# Patient Record
Sex: FEMALE | Race: WHITE | Employment: FULL TIME | URBAN - METROPOLITAN AREA
[De-identification: names, ages, dates, MRNs, and addresses within clinical notes are randomized per-mention and may not be internally consistent; named-entity substitution may affect disease eponyms.]

---

## 2017-05-22 ENCOUNTER — OFFICE VISIT (OUTPATIENT)
Dept: INTERNAL MEDICINE CLINIC | Facility: CLINIC | Age: 42
End: 2017-05-22

## 2017-05-22 VITALS
DIASTOLIC BLOOD PRESSURE: 80 MMHG | SYSTOLIC BLOOD PRESSURE: 116 MMHG | BODY MASS INDEX: 29 KG/M2 | RESPIRATION RATE: 16 BRPM | HEART RATE: 71 BPM | WEIGHT: 167 LBS | TEMPERATURE: 98 F

## 2017-05-22 DIAGNOSIS — B37.2 CANDIDAL SKIN INFECTION: ICD-10-CM

## 2017-05-22 DIAGNOSIS — R21 RASH AND NONSPECIFIC SKIN ERUPTION: Primary | ICD-10-CM

## 2017-05-22 PROCEDURE — 99212 OFFICE O/P EST SF 10 MIN: CPT | Performed by: INTERNAL MEDICINE

## 2017-05-22 PROCEDURE — 99214 OFFICE O/P EST MOD 30 MIN: CPT | Performed by: INTERNAL MEDICINE

## 2017-05-22 RX ORDER — SULFAMETHOXAZOLE AND TRIMETHOPRIM 800; 160 MG/1; MG/1
1 TABLET ORAL 2 TIMES DAILY
Qty: 6 TABLET | Refills: 0 | Status: SHIPPED | OUTPATIENT
Start: 2017-05-22 | End: 2017-05-25

## 2017-05-22 RX ORDER — CLOTRIMAZOLE 1 %
1 CREAM (GRAM) TOPICAL 2 TIMES DAILY
Qty: 14 G | Refills: 0 | Status: SHIPPED | OUTPATIENT
Start: 2017-05-22 | End: 2018-02-13

## 2017-05-22 NOTE — PROGRESS NOTES
Hazel Hale is a 37year old female.   Patient presents with:  Rash: presenting for a rash by lip and under neck x1 week      HPI:   Patient is a 28-year-old woman no significant past medical history complains of a rash she has noticed just on the an History   Procedure Laterality Date   •   2007      Social History:    Smoking Status: Never Smoker                      Smokeless Status: Never Used                        Alcohol Use: Yes                Comment: Socially       REVIEW OF SY inf-= will treat for mrsa as well , will check labs for vit def   The patient indicates understanding of these issues and agrees to the plan.   Advised to wash hands frequently  Can try some triple antibiotic ointment only at the lateral edge where there is

## 2017-06-27 ENCOUNTER — OFFICE VISIT (OUTPATIENT)
Dept: OBGYN CLINIC | Facility: CLINIC | Age: 42
End: 2017-06-27

## 2017-06-27 VITALS
WEIGHT: 162 LBS | HEIGHT: 63 IN | DIASTOLIC BLOOD PRESSURE: 68 MMHG | BODY MASS INDEX: 28.7 KG/M2 | SYSTOLIC BLOOD PRESSURE: 103 MMHG | HEART RATE: 81 BPM

## 2017-06-27 DIAGNOSIS — N85.2 ENLARGED UTERUS: ICD-10-CM

## 2017-06-27 DIAGNOSIS — Z01.411 ENCOUNTER FOR GYNECOLOGICAL EXAMINATION WITH ABNORMAL FINDING: Primary | ICD-10-CM

## 2017-06-27 DIAGNOSIS — Z97.5 IUD (INTRAUTERINE DEVICE) IN PLACE: ICD-10-CM

## 2017-06-27 DIAGNOSIS — N89.8 VAGINAL ODOR: ICD-10-CM

## 2017-06-27 PROCEDURE — 99396 PREV VISIT EST AGE 40-64: CPT | Performed by: OBSTETRICS & GYNECOLOGY

## 2017-06-27 PROCEDURE — 99212 OFFICE O/P EST SF 10 MIN: CPT | Performed by: OBSTETRICS & GYNECOLOGY

## 2017-06-27 NOTE — PROGRESS NOTES
Tenzin Cardona is a 40year old female  No LMP recorded. Patient is not currently having periods (Reason: IUD - Intrauterine Device).   Patient presents with:  Gyn Exam: ANNUAL EXAM  Pt c/o intermittent fishy vaginal odor- is not present today- r denies blurred or double vision  Cardiovascular:  denies chest pain or palpitations  Respiratory:  denies shortness of breath  Gastrointestinal:  denies heartburn, abdominal pain, diarrhea or constipation  Genitourinary:  denies dysuria, incontinence, abno (intrauterine device) in place  ASCCP guidelines discussed,cotest done,rtc 1 year for annual exam  Will call with US results- discussed prob fibroids with pt and expectant management since asymptomatic. Call if vag odor recurs and does not resolve.       Vivien Burkett

## 2017-06-27 NOTE — ADDENDUM NOTE
Addended by: [facility] [facility] [place] 6/27/2017 03:59 PM     Modules accepted: Orders

## 2017-07-17 ENCOUNTER — TELEPHONE (OUTPATIENT)
Dept: OBGYN CLINIC | Facility: CLINIC | Age: 42
End: 2017-07-17

## 2017-07-17 NOTE — TELEPHONE ENCOUNTER
it is just a matter of what she prefers, if she does not mind having her periods and does not want hormone in it then the paragard should be chosen.   But if she has had no problems with the mirena then there is no reason she should not just have it replac

## 2017-07-17 NOTE — TELEPHONE ENCOUNTER
Pt never recvd her results saw Dr Niki Pratt 6/17 for pap/annual exm/[place] loc. lv vm if no answer.  [phone]

## 2017-07-17 NOTE — TELEPHONE ENCOUNTER
Pt informed of below and verbalized understanding. States she will think about it and call to schedule the exchange.

## 2017-07-18 ENCOUNTER — HOSPITAL ENCOUNTER (OUTPATIENT)
Dept: ULTRASOUND IMAGING | Age: 42
Discharge: HOME | End: 2017-07-18
Attending: OBSTETRICS & GYNECOLOGY
Payer: COMMERCIAL

## 2017-07-18 DIAGNOSIS — N85.2 ENLARGED UTERUS: ICD-10-CM

## 2017-07-18 PROCEDURE — 76830 TRANSVAGINAL US NON-OB: CPT | Performed by: OBSTETRICS & GYNECOLOGY

## 2017-07-18 PROCEDURE — 76856 US EXAM PELVIC COMPLETE: CPT | Performed by: OBSTETRICS & GYNECOLOGY

## 2017-08-01 ENCOUNTER — TELEPHONE (OUTPATIENT)
Dept: OBGYN CLINIC | Facility: CLINIC | Age: 42
End: 2017-08-01

## 2017-08-01 NOTE — TELEPHONE ENCOUNTER
----- Message from Shelia Schilling MD sent at 7/27/2017 11:19 AM CDT -----  Uterus is slightly enlarged but there are no fbroids seen, US is otherwise normal

## 2017-08-08 NOTE — PROGRESS NOTES
Neg pap, HPV neg, repeat pap needed in 3 years, return to clinic 1 year for annual exam. Letter sent via mail.

## 2017-12-05 ENCOUNTER — IMMUNIZATION (OUTPATIENT)
Dept: INTERNAL MEDICINE CLINIC | Facility: CLINIC | Age: 42
End: 2017-12-05

## 2017-12-05 DIAGNOSIS — Z23 NEED FOR VACCINATION: ICD-10-CM

## 2017-12-05 PROCEDURE — 90686 IIV4 VACC NO PRSV 0.5 ML IM: CPT | Performed by: INTERNAL MEDICINE

## 2017-12-05 PROCEDURE — 90471 IMMUNIZATION ADMIN: CPT | Performed by: INTERNAL MEDICINE

## 2018-01-23 ENCOUNTER — OFFICE VISIT (OUTPATIENT)
Dept: INTERNAL MEDICINE CLINIC | Facility: CLINIC | Age: 43
End: 2018-01-23

## 2018-01-23 VITALS
SYSTOLIC BLOOD PRESSURE: 102 MMHG | HEIGHT: 63 IN | HEART RATE: 69 BPM | WEIGHT: 161 LBS | TEMPERATURE: 98 F | BODY MASS INDEX: 28.53 KG/M2 | DIASTOLIC BLOOD PRESSURE: 68 MMHG

## 2018-01-23 DIAGNOSIS — Z00.00 ROUTINE GENERAL MEDICAL EXAMINATION AT A HEALTH CARE FACILITY: Primary | ICD-10-CM

## 2018-01-23 DIAGNOSIS — L98.9 SKIN LESION: ICD-10-CM

## 2018-01-23 DIAGNOSIS — N60.12 FIBROCYSTIC BREAST CHANGES OF BOTH BREASTS: ICD-10-CM

## 2018-01-23 DIAGNOSIS — R92.2 DENSE BREAST: ICD-10-CM

## 2018-01-23 DIAGNOSIS — Z91.89 AT HIGH RISK FOR BREAST CANCER: ICD-10-CM

## 2018-01-23 DIAGNOSIS — R21 RASH AND NONSPECIFIC SKIN ERUPTION: ICD-10-CM

## 2018-01-23 DIAGNOSIS — N60.11 FIBROCYSTIC BREAST CHANGES OF BOTH BREASTS: ICD-10-CM

## 2018-01-23 PROCEDURE — 99396 PREV VISIT EST AGE 40-64: CPT | Performed by: INTERNAL MEDICINE

## 2018-01-23 NOTE — PROGRESS NOTES
HPI:    Patient ID: Molina Garsia is a 40year old female.     HPI    Physical exam    Blood work from work  Per pt WNL  Recent  Need to replace IUD  Will see  Page        /68 (BP Location: Right arm, Patient Position: Sitting, Cuff Size: adult Surgical History:  , :    Family History   Problem Relation Age of Onset   • Cancer Mother      Cancer, thyroid   • Breast Cancer Mother 39      Social History: Smoking status: Never Smoker (CPT=77066/52367), SURGERY - INTERNAL     Self breast exam advised    (R92.2) Dense breast  Plan: [facility] JACOBY 2D+3D DIAGNOSTIC [facility]  BILAT         (CPT=77066/67990), SURGERY - INTERNAL  . Breast exam.  Bilateral fibrocystic breast    No discrete palpable masses

## 2018-01-26 ENCOUNTER — HOSPITAL ENCOUNTER (OUTPATIENT)
Dept: MAMMOGRAPHY | Facility: HOSPITAL | Age: 43
Discharge: HOME | End: 2018-01-26
Attending: INTERNAL MEDICINE
Payer: COMMERCIAL

## 2018-01-26 DIAGNOSIS — N60.11 FIBROCYSTIC BREAST CHANGES OF BOTH BREASTS: ICD-10-CM

## 2018-01-26 DIAGNOSIS — N60.12 FIBROCYSTIC BREAST CHANGES OF BOTH BREASTS: ICD-10-CM

## 2018-01-26 DIAGNOSIS — R92.2 DENSE BREAST: ICD-10-CM

## 2018-01-26 DIAGNOSIS — Z91.89 AT HIGH RISK FOR BREAST CANCER: ICD-10-CM

## 2018-01-26 PROCEDURE — 77062 BREAST TOMOSYNTHESIS BI: CPT | Performed by: INTERNAL MEDICINE

## 2018-01-26 PROCEDURE — 77066 DX MAMMO INCL CAD BI: CPT | Performed by: INTERNAL MEDICINE

## 2018-02-13 ENCOUNTER — OFFICE VISIT (OUTPATIENT)
Dept: DERMATOLOGY CLINIC | Facility: CLINIC | Age: 43
End: 2018-02-13

## 2018-02-13 DIAGNOSIS — D23.72 DERMATOFIBROMA OF LEFT LOWER LEG: Primary | ICD-10-CM

## 2018-02-13 DIAGNOSIS — L30.4 INTERTRIGO: ICD-10-CM

## 2018-02-13 DIAGNOSIS — D48.5 NEOPLASM OF UNCERTAIN BEHAVIOR OF SKIN: ICD-10-CM

## 2018-02-13 PROCEDURE — 11100 BIOPSY OF SKIN LESION: CPT | Performed by: DERMATOLOGY

## 2018-02-13 PROCEDURE — 88305 TISSUE EXAM BY PATHOLOGIST: CPT | Performed by: DERMATOLOGY

## 2018-02-13 PROCEDURE — 99202 OFFICE O/P NEW SF 15 MIN: CPT | Performed by: DERMATOLOGY

## 2018-02-13 RX ORDER — DESONIDE 0.5 MG/G
CREAM TOPICAL
Qty: 30 G | Refills: 2 | Status: SHIPPED | OUTPATIENT
Start: 2018-02-13

## 2018-02-13 NOTE — PROGRESS NOTES
Past Medical History:   Diagnosis Date   • Pregnancy 2004    ; Outcome/detail:  40 week 6 lb(s) 9 oz Male   • Pregnancy 2007    ;  Outcome/detail:  39 week 7 lb(s) 15 oz Male     Past Surgical History:  , :     Social Histor

## 2018-02-13 NOTE — PROGRESS NOTES
HPI:     Chief Complaint     Lesion; Rash        HPI     Lesion    Additional comments: New pt. Pt presents with lesion on Left shin, present x years, pt states it has been getting redder and bleeds when shaved over.  Pt has family hx of skin cancer on pat Past Surgical History:  , :     Social History  Social History   Marital status:   Spouse name: N/A    Years of education: N/A  Number of children: N/A     Occupational History  None on file     Social History Main Topics   Smok out atypia–macro shave      Orders Placed This Encounter      BIOPSY OF SKIN LESION      Specimen to Pathology, Tissue [IHP Pt to Marylin Cage    Results From Past 48 Hours:  No results found for this or any previous visit (from the past 48 hour(s)).     Med

## 2018-03-16 ENCOUNTER — TELEPHONE (OUTPATIENT)
Dept: OBGYN CLINIC | Facility: CLINIC | Age: 43
End: 2018-03-16

## 2018-03-16 DIAGNOSIS — Z32.00 PREGNANCY EXAMINATION OR TEST, PREGNANCY UNCONFIRMED: Primary | ICD-10-CM

## 2018-03-16 NOTE — TELEPHONE ENCOUNTER
Pt requesting IUD removal. States Dr. Torres Jackson did the insert, requesting removal w/ any female provider. pls adv.

## 2018-04-13 NOTE — TELEPHONE ENCOUNTER
Dr. Mary Carmen Workman pt, had IUD placed 2012 ( 2017). Pt saw [facility] in 2017 for annual. She has appt scheduled for IUD removal on  with [facility]. Pt asking if she can do an IUD exchange that day. IUD is . Routed to [facility] for recs.  If serum hcg the day bef

## 2018-04-14 NOTE — TELEPHONE ENCOUNTER
Informed pt that [facility] stated ok for IUD exchange. Pt wants the Mirena. Pt will have a hcg serum bhcg done at the day before her exchange. Informed pt where to go for the bhcg. Pt has the appt time for the appt, location and time with [facility].

## 2018-04-14 NOTE — TELEPHONE ENCOUNTER
Yes ok for IUD exchange but she needs to decide which IUD she wants- paragard? Another mirena?   Yes she needs to have a serum bhcg done the day before her exchange

## 2018-04-16 ENCOUNTER — APPOINTMENT (OUTPATIENT)
Dept: LAB | Age: 43
End: 2018-04-16
Attending: INTERNAL MEDICINE
Payer: COMMERCIAL

## 2018-04-16 DIAGNOSIS — Z32.00 PREGNANCY EXAMINATION OR TEST, PREGNANCY UNCONFIRMED: ICD-10-CM

## 2018-04-16 PROCEDURE — 36415 COLL VENOUS BLD VENIPUNCTURE: CPT

## 2018-04-16 PROCEDURE — 84703 CHORIONIC GONADOTROPIN ASSAY: CPT

## 2018-04-17 ENCOUNTER — OFFICE VISIT (OUTPATIENT)
Dept: OBGYN CLINIC | Facility: CLINIC | Age: 43
End: 2018-04-17

## 2018-04-17 VITALS
WEIGHT: 156 LBS | SYSTOLIC BLOOD PRESSURE: 108 MMHG | HEART RATE: 80 BPM | BODY MASS INDEX: 28 KG/M2 | DIASTOLIC BLOOD PRESSURE: 74 MMHG

## 2018-04-17 DIAGNOSIS — Z30.433 ENCOUNTER FOR REMOVAL AND REINSERTION OF INTRAUTERINE CONTRACEPTIVE DEVICE: Primary | ICD-10-CM

## 2018-04-17 PROCEDURE — 58301 REMOVE INTRAUTERINE DEVICE: CPT | Performed by: OBSTETRICS & GYNECOLOGY

## 2018-04-17 PROCEDURE — 58300 INSERT INTRAUTERINE DEVICE: CPT | Performed by: OBSTETRICS & GYNECOLOGY

## 2018-05-24 NOTE — TELEPHONE ENCOUNTER
Notes Recorded by Michael Concepcion MD on 7/5/2017 at 4:49 PM CDT  Neg pap, HPV neg, repeat pap needed in 3 years, return to clinic 1 year for annual exam,send letter    Pt informed of [facility]s recs and verbalized understanding.  Pt also asking when she is due t [FreeTextEntry1] : Pt c/o Right side head pain by temple do to tripping over an extension cord  in her basement on 5/18/18. \par Pt states she had no pain for the first 3 days but a lot of bleeding after it happened. After three days she has had on/off pain. \par Pt denies any nausea after fall. \par Pt denies N/f or W/c.\par Rx refills on  Enalapril, Metoprolol, Omeprazole, Simvastatin. \par Pt also requesting new scrips for Mammo, Colonoscopy and Endoscopy.  \par  [de-identified] : the pt tripped over extension cord 6 days ago and sustained laceration to right side of forehead and c/o twinge of pain on right side of head several times over the past 3 days. States it is momentary and mild not requiring any meds. No LOC,No  Nausea or vomiting

## 2018-06-12 ENCOUNTER — OFFICE VISIT (OUTPATIENT)
Dept: INTERNAL MEDICINE CLINIC | Facility: CLINIC | Age: 43
End: 2018-06-12

## 2018-06-12 VITALS
DIASTOLIC BLOOD PRESSURE: 66 MMHG | BODY MASS INDEX: 28.53 KG/M2 | TEMPERATURE: 98 F | HEIGHT: 63 IN | HEART RATE: 69 BPM | SYSTOLIC BLOOD PRESSURE: 99 MMHG | WEIGHT: 161 LBS

## 2018-06-12 DIAGNOSIS — M53.3 SACRAL PAIN: Primary | ICD-10-CM

## 2018-06-12 PROCEDURE — 99212 OFFICE O/P EST SF 10 MIN: CPT | Performed by: INTERNAL MEDICINE

## 2018-06-12 PROCEDURE — 99213 OFFICE O/P EST LOW 20 MIN: CPT | Performed by: INTERNAL MEDICINE

## 2018-06-12 NOTE — PROGRESS NOTES
Urszula Page is a 44year old female. Patient presents with:  Pain: Tailbone pain when getting up since 2/2018. Has not gotten any better.        HPI:   Pt is a 45 yo comes as an urgent visit   c/c tail bone pain   c/o tail bone pain since feb   Saw adult)   Pulse 69   Temp 97.8 °F (36.6 °C) (Oral)   Ht 5' 3\" (1.6 m)   Wt 161 lb (73 kg)   BMI 28.52 kg/m²   GENERAL: well developed, well nourished,in no apparent distress  SKIN: no rashes,no suspicious lesions  HEENT: atraumatic, normocephalic  GI: good

## 2018-06-12 NOTE — PATIENT INSTRUCTIONS
Caring for Your Back Throughout the Day  Take care of your back throughout the day. You will likely have fewer back problems if you do. Try to warm up before you move. Shift positions often. Also do your best to form healthy habits.     Warm up for the da

## 2018-06-26 ENCOUNTER — HOSPITAL ENCOUNTER (OUTPATIENT)
Dept: GENERAL RADIOLOGY | Age: 43
Discharge: HOME | End: 2018-06-26
Attending: INTERNAL MEDICINE
Payer: COMMERCIAL

## 2018-06-26 DIAGNOSIS — M53.3 SACRAL PAIN: ICD-10-CM

## 2018-06-26 PROCEDURE — 73522 X-RAY EXAM HIPS BI 3-4 VIEWS: CPT | Performed by: INTERNAL MEDICINE

## 2018-07-31 ENCOUNTER — OFFICE VISIT (OUTPATIENT)
Dept: INTERNAL MEDICINE CLINIC | Facility: CLINIC | Age: 43
End: 2018-07-31

## 2018-07-31 VITALS
HEART RATE: 80 BPM | SYSTOLIC BLOOD PRESSURE: 138 MMHG | BODY MASS INDEX: 28.7 KG/M2 | DIASTOLIC BLOOD PRESSURE: 81 MMHG | WEIGHT: 162 LBS | TEMPERATURE: 98 F | HEIGHT: 63 IN

## 2018-07-31 DIAGNOSIS — M53.3 COCCYDYNIA: Primary | ICD-10-CM

## 2018-07-31 DIAGNOSIS — J01.90 ACUTE NON-RECURRENT SINUSITIS, UNSPECIFIED LOCATION: ICD-10-CM

## 2018-07-31 PROCEDURE — 99212 OFFICE O/P EST SF 10 MIN: CPT | Performed by: INTERNAL MEDICINE

## 2018-07-31 PROCEDURE — 99214 OFFICE O/P EST MOD 30 MIN: CPT | Performed by: INTERNAL MEDICINE

## 2018-07-31 RX ORDER — AMOXICILLIN 875 MG/1
875 TABLET, COATED ORAL 2 TIMES DAILY
Qty: 20 TABLET | Refills: 0 | Status: SHIPPED | OUTPATIENT
Start: 2018-07-31 | End: 2018-08-10

## 2018-07-31 NOTE — PROGRESS NOTES
HPI:    Patient ID: Zackary Orona is a 44year old female.     HPI    Recent OV reivewed  Tailbone pian for about 6 mo  Does not rememeber acute trauma  Sharp pain daily after sitting  Hard surface of prolonged sitting    Saw MD  Pelvic XR negative  NS well-nourished. No distress. HENT:   Head: Normocephalic and atraumatic. Right Ear: External ear normal.   Left Ear: External ear normal.   Nose: Mucosal edema present. Mouth/Throat: Oropharynx is clear and moist. No oropharyngeal exudate.    Eyes: Co

## 2018-07-31 NOTE — PATIENT INSTRUCTIONS
Understanding Coccygodynia    Coccygodynia is pain at the lowest tip of the spine (the coccyx, or tailbone). This is sometimes called a “bruised tailbone.” Tailbone pain can be very uncomfortable.  It can also interfere with daily activities, such as driv · Pain that continues for more than 2 months or gets worse  · Pain that limits your usual activities  · Pain that doesn’t get better by trying the self-care treatments described above  · Fever of 100.4°F (38°C) or higher, or as directed  · Redness or swell · Ice the injured area to help reduce pain and swelling. Wrap a cold source (ice pack or ice cubes in a plastic bag) in a thin towel. Apply to the bruised area for 20 minutes every 1 to 2 hours the first day.  Continue this 3 to 4 times a day until the pain · Apply an ice pack over the injured area for not more than 20 minutes. Do this every 1 to 2 hours for the first 24 to 48 hours. Keep using ice packs as needed to ease pain and swelling.  To make an ice pack, put ice cubes in a plastic bag that seals at the

## 2018-08-21 ENCOUNTER — HOSPITAL ENCOUNTER (OUTPATIENT)
Dept: GENERAL RADIOLOGY | Age: 43
Discharge: HOME | End: 2018-08-21
Attending: INTERNAL MEDICINE
Payer: COMMERCIAL

## 2018-08-21 DIAGNOSIS — M53.3 COCCYDYNIA: ICD-10-CM

## 2018-08-21 PROCEDURE — 72220 X-RAY EXAM SACRUM TAILBONE: CPT | Performed by: INTERNAL MEDICINE

## 2018-10-09 ENCOUNTER — OFFICE VISIT (OUTPATIENT)
Dept: INTERNAL MEDICINE CLINIC | Facility: CLINIC | Age: 43
End: 2018-10-09

## 2018-10-09 VITALS
WEIGHT: 165.63 LBS | DIASTOLIC BLOOD PRESSURE: 67 MMHG | BODY MASS INDEX: 29.35 KG/M2 | HEIGHT: 63 IN | HEART RATE: 76 BPM | SYSTOLIC BLOOD PRESSURE: 112 MMHG

## 2018-10-09 DIAGNOSIS — M53.3 COCCYGEAL PAIN, CHRONIC: Primary | ICD-10-CM

## 2018-10-09 DIAGNOSIS — G89.29 COCCYGEAL PAIN, CHRONIC: Primary | ICD-10-CM

## 2018-10-09 PROCEDURE — 99213 OFFICE O/P EST LOW 20 MIN: CPT | Performed by: INTERNAL MEDICINE

## 2018-10-09 PROCEDURE — 90471 IMMUNIZATION ADMIN: CPT | Performed by: INTERNAL MEDICINE

## 2018-10-09 PROCEDURE — 99212 OFFICE O/P EST SF 10 MIN: CPT | Performed by: INTERNAL MEDICINE

## 2018-10-09 PROCEDURE — 90686 IIV4 VACC NO PRSV 0.5 ML IM: CPT | Performed by: INTERNAL MEDICINE

## 2018-10-09 NOTE — PROGRESS NOTES
Latricia Dempsey is a 44year old female.   Patient presents with:  Pain: tailbone pain follow up      HPI:   Pt comes for f/u  C/c coccyx pain   C/o coccyx pain - thinks that it is getting worse because now it happens when she is lying down in the bathtu heartburn, nausea or vomiting  : No Pain on urination, change in the color of urine, discharge, urinating frequently  MUS: No back pain-- just the sacrum , joint pain, muscle pain  NEURO: denies headaches , anxiety, depression    EXAM:   /67 (BP Lo

## 2018-10-09 NOTE — PATIENT INSTRUCTIONS
Understanding Coccygodynia    Coccygodynia is pain at the lowest tip of the spine (the coccyx, or tailbone). This is sometimes called a “bruised tailbone.” Tailbone pain can be very uncomfortable.  It can also interfere with daily activities, such as driv · Pain that continues for more than 2 months or gets worse  · Pain that limits your usual activities  · Pain that doesn’t get better by trying the self-care treatments described above  · Fever of 100.4°F (38°C) or higher, or as directed  · Redness or swell · Over-the-counter or prescription pain medicine to help relieve pain and swelling  · Warm or cold to help relieve symptoms for a time, such as a heating pad, hot water bottle, or ice pack  · Keeping pressure off the tailbone to help the area heal by shift

## 2018-11-02 ENCOUNTER — OFFICE VISIT (OUTPATIENT)
Dept: PHYSICAL THERAPY | Age: 43
End: 2018-11-02
Attending: INTERNAL MEDICINE
Payer: COMMERCIAL

## 2018-11-02 DIAGNOSIS — G89.29 COCCYGEAL PAIN, CHRONIC: ICD-10-CM

## 2018-11-02 DIAGNOSIS — M53.3 COCCYGEAL PAIN, CHRONIC: ICD-10-CM

## 2018-11-02 NOTE — PROGRESS NOTES
Patient seen in clinic for possible PT evaluation today.  After discussion with patient regarding diagnosis and symptoms it was determined that patient may be more appropriate to be seen by a pelvic floor physical therapist. Pt provided contact info for raheel

## 2018-11-06 ENCOUNTER — APPOINTMENT (OUTPATIENT)
Dept: PHYSICAL THERAPY | Age: 43
End: 2018-11-06
Attending: INTERNAL MEDICINE
Payer: COMMERCIAL

## 2018-11-09 ENCOUNTER — APPOINTMENT (OUTPATIENT)
Dept: PHYSICAL THERAPY | Age: 43
End: 2018-11-09
Attending: INTERNAL MEDICINE
Payer: COMMERCIAL

## 2018-11-13 ENCOUNTER — APPOINTMENT (OUTPATIENT)
Dept: PHYSICAL THERAPY | Age: 43
End: 2018-11-13
Attending: INTERNAL MEDICINE
Payer: COMMERCIAL

## 2018-11-16 ENCOUNTER — APPOINTMENT (OUTPATIENT)
Dept: PHYSICAL THERAPY | Age: 43
End: 2018-11-16
Attending: INTERNAL MEDICINE
Payer: COMMERCIAL

## 2018-11-20 ENCOUNTER — APPOINTMENT (OUTPATIENT)
Dept: PHYSICAL THERAPY | Age: 43
End: 2018-11-20
Attending: INTERNAL MEDICINE
Payer: COMMERCIAL

## 2018-11-27 ENCOUNTER — APPOINTMENT (OUTPATIENT)
Dept: PHYSICAL THERAPY | Age: 43
End: 2018-11-27
Attending: INTERNAL MEDICINE
Payer: COMMERCIAL

## 2018-11-30 ENCOUNTER — APPOINTMENT (OUTPATIENT)
Dept: PHYSICAL THERAPY | Age: 43
End: 2018-11-30
Attending: INTERNAL MEDICINE
Payer: COMMERCIAL

## 2018-12-04 ENCOUNTER — OFFICE VISIT (OUTPATIENT)
Dept: PHYSICAL THERAPY | Facility: HOSPITAL | Age: 43
End: 2018-12-04
Attending: INTERNAL MEDICINE
Payer: COMMERCIAL

## 2018-12-04 PROCEDURE — 97140 MANUAL THERAPY 1/> REGIONS: CPT

## 2018-12-04 PROCEDURE — 97535 SELF CARE MNGMENT TRAINING: CPT

## 2018-12-04 PROCEDURE — 97161 PT EVAL LOW COMPLEX 20 MIN: CPT

## 2018-12-04 NOTE — PROGRESS NOTES
MUSCULOSKELETAL AND PELVIC FLOOR EVALUATION:   Referring Physician: Dr. Lb Anderson  Diagnosis: Coccygeal pain, chronic (M53.3,G89.29)  Date of Onset: February 2018 Date of Service: 12/4/2018     PATIENT SUMMARY   Donte Mendez is a 44year old y/o fem standing and walking  Equipment Currently Using: none  Living Situation: house  Stairs: yes, no difficulty with   Pertaining Imaging: x-ray, see chart  FOTO outcome score: 29% impaired, PFDI pain survey    Precautions: [place] in October down the stairs lb(s) 15 oz Male     Red Flag Screening Yes/No Comments   Age over 48 no    Bowel or bladder Dysfunction/Saddle Anesthesia no    History of Cancer no    Immune Suppression no    History of Trauma yes Fall in October down the stairs    Night Pain, Unexplain [employer] Status: no  Pain with initial penetration: no  Pain with deep penetration: no  How long does pain last after intercourse if present: no  Orgasm Status: no  Pain with pelvic exam: no  Pain with tampon use: no  Pain with movement: no    GAIT ANAL Compress Urethrae/Sphincter Urethrovaginalis   WNL WNL    Pubococcygeus/Pubovaginalis minimal restriction minimal restriction    Iliococcygeus minimal restriction minimal restriction    Coccygeus moderate restriction and tenderness moderate restriction a with proper posture during sitting to improve sitting tolerance to >1 hour. Patient Goal: \"get rid of tailbone pain. \"    Rehab Potential: good  Limiting factors: none  Clinical Presentation: stable    Skilled intervention will include: Manual Therap

## 2018-12-11 ENCOUNTER — OFFICE VISIT (OUTPATIENT)
Dept: PHYSICAL THERAPY | Facility: HOSPITAL | Age: 43
End: 2018-12-11
Attending: INTERNAL MEDICINE
Payer: COMMERCIAL

## 2018-12-11 PROCEDURE — 97140 MANUAL THERAPY 1/> REGIONS: CPT

## 2018-12-11 PROCEDURE — 97112 NEUROMUSCULAR REEDUCATION: CPT

## 2018-12-11 NOTE — PROGRESS NOTES
Dx: Coccygeal pain, chronic (M53.3,G89.29)          Authorized # of Visits/Insurance:  [facility] ([facility]; 10/9 - 12/31)  Script Dates: 12/4/18 - 1/29/19           Next MD visit: none scheduled  Referring MD: Rachel Godinez  Fall Risk:  standard         Precautions progressive HEP during and upon discharge to aide with symptom management and return to previous level of function.    2. Patient to report ability to sit for 1 hour without difficulty or pain for ADLs and work related tasks.    3. Patient to improved globa

## 2018-12-18 ENCOUNTER — OFFICE VISIT (OUTPATIENT)
Dept: PHYSICAL THERAPY | Facility: HOSPITAL | Age: 43
End: 2018-12-18
Attending: INTERNAL MEDICINE
Payer: COMMERCIAL

## 2018-12-18 PROCEDURE — 97140 MANUAL THERAPY 1/> REGIONS: CPT

## 2018-12-18 PROCEDURE — 97112 NEUROMUSCULAR REEDUCATION: CPT

## 2018-12-18 NOTE — PROGRESS NOTES
Physical Therapy Progress Summary    Reporting Period: 12/4 to 12/18    Patient Name: Igor Roman  YOB: 1975          MRN number:  [id number]  Date:  12/18/2018  Referring Physician:  Sherry Stone  Dx: Coccygeal pain, chronic (M53.3, and quick contractions. No prolapse noted with valsalva. Continued to focus treatment on external soft tissue mobilization with pt tolerating well.  Most notable TTP at base of coccyx and along medial attachment of coccygeus/levator ani bilateral. Pt tolera none  Umbilicus: none  Below Umbilicus: none    PELVIC EXAMINATION  Verbal consent given for internal examination: yes    External Observation  Mons pubis: WNL  Labia majora: WNL  Labia minora: WNL  Urethral meatus: WNL  Introitus: WNL  Perineal body:  WNL progressive HEP during and upon discharge to aide with symptom management and return to previous level of function.    2. Patient to report ability to sit for 1 hour without difficulty or pain for ADLs and work related tasks.    3. Patient to improved globa

## 2019-01-08 ENCOUNTER — OFFICE VISIT (OUTPATIENT)
Dept: PHYSICAL THERAPY | Facility: HOSPITAL | Age: 44
End: 2019-01-08
Attending: INTERNAL MEDICINE
Payer: COMMERCIAL

## 2019-01-08 PROCEDURE — 97140 MANUAL THERAPY 1/> REGIONS: CPT

## 2019-01-08 PROCEDURE — 97535 SELF CARE MNGMENT TRAINING: CPT

## 2019-01-08 NOTE — PROGRESS NOTES
Dx: Coccygeal pain, chronic (M53.3,G89.29)          [facility] - 4/1/9)  Script Dates: 12/18/18 - 2/15/19          Next MD visit: none scheduled  Referring MD: Jose Raul Bauman  Fall Risk:  standard         Precautio levator ani, coccygeus, piriformis  -pt prone with legs propped  Strain/counterstrain: B ileococcygeus/pubococcygeus 2x30\" ea     Neuromuscular Re-education      Therapeutic Activity/Self Care Education on PF anatomy/function x10 min       Assessment: Spe

## 2019-01-15 ENCOUNTER — OFFICE VISIT (OUTPATIENT)
Dept: PHYSICAL THERAPY | Facility: HOSPITAL | Age: 44
End: 2019-01-15
Attending: INTERNAL MEDICINE
Payer: COMMERCIAL

## 2019-01-15 PROCEDURE — 97140 MANUAL THERAPY 1/> REGIONS: CPT

## 2019-01-15 PROCEDURE — 97535 SELF CARE MNGMENT TRAINING: CPT

## 2019-01-15 NOTE — PROGRESS NOTES
Dx: Coccygeal pain, chronic (M53.3,G89.29)          [facility]  Script Dates: 12/18/18 - 2/15/19          Next MD visit: none scheduled  Referring MD: Carina Marin  Fall Risk:  standard         Precautio Therapeutic Exercise      Manual  STM, CT stretching, trigger point: B levator ani, coccygeus, piriformis  -pt prone with legs propped  Strain/counterstrain: B ileococcygeus/pubococcygeus 2x30\" ea MHP applied to sacrum to begin  STM, CT stretching, trig

## 2019-01-22 ENCOUNTER — APPOINTMENT (OUTPATIENT)
Dept: PHYSICAL THERAPY | Facility: HOSPITAL | Age: 44
End: 2019-01-22
Attending: INTERNAL MEDICINE
Payer: COMMERCIAL

## 2019-01-29 ENCOUNTER — APPOINTMENT (OUTPATIENT)
Dept: PHYSICAL THERAPY | Facility: HOSPITAL | Age: 44
End: 2019-01-29
Attending: INTERNAL MEDICINE
Payer: COMMERCIAL

## 2019-02-05 ENCOUNTER — APPOINTMENT (OUTPATIENT)
Dept: PHYSICAL THERAPY | Facility: HOSPITAL | Age: 44
End: 2019-02-05
Attending: INTERNAL MEDICINE
Payer: COMMERCIAL

## 2019-02-25 NOTE — DISCHARGE SUMMARY
Patient has not returned to therapy following last visit on 1/15/19.  As previously discussed, if patient was doing well and canceled remaining apt, patient would be d/c from PT.

## 2019-03-19 ENCOUNTER — E-VISIT (OUTPATIENT)
Dept: FAMILY MEDICINE CLINIC | Facility: CLINIC | Age: 44
End: 2019-03-19

## 2019-03-19 DIAGNOSIS — R30.0 DYSURIA: Primary | ICD-10-CM

## 2019-03-19 PROCEDURE — 98969 ONLINE SERVICE BY HC PRO: CPT | Performed by: NURSE PRACTITIONER

## 2019-03-19 RX ORDER — NITROFURANTOIN 25; 75 MG/1; MG/1
100 CAPSULE ORAL 2 TIMES DAILY
Qty: 14 CAPSULE | Refills: 0 | Status: SHIPPED | OUTPATIENT
Start: 2019-03-19 | End: 2019-03-26

## 2019-03-22 ENCOUNTER — OFFICE VISIT (OUTPATIENT)
Dept: INTERNAL MEDICINE CLINIC | Facility: CLINIC | Age: 44
End: 2019-03-22

## 2019-03-22 VITALS
HEIGHT: 63 IN | BODY MASS INDEX: 29.41 KG/M2 | SYSTOLIC BLOOD PRESSURE: 115 MMHG | WEIGHT: 166 LBS | DIASTOLIC BLOOD PRESSURE: 79 MMHG | HEART RATE: 94 BPM

## 2019-03-22 DIAGNOSIS — D22.9 BENIGN MOLE: ICD-10-CM

## 2019-03-22 DIAGNOSIS — Z12.31 VISIT FOR SCREENING MAMMOGRAM: ICD-10-CM

## 2019-03-22 DIAGNOSIS — Z00.00 PHYSICAL EXAM, ANNUAL: Primary | ICD-10-CM

## 2019-03-22 PROCEDURE — 99396 PREV VISIT EST AGE 40-64: CPT | Performed by: INTERNAL MEDICINE

## 2019-03-22 NOTE — PATIENT INSTRUCTIONS
Prevention Guidelines, Women Ages 36 to 52  Screening tests and vaccines are an important part of managing your health. A screening test is done to find possible disorders or diseases in people who don't have any symptoms.  The goal is to find a disease e Depression All women in this age group At routine exams   Gonorrhea Sexually active women at increased risk for infection At routine exams   Hepatitis C Anyone at increased risk; 1 time for those born between [facility] At routine exams   High cholester Meningococcal Women at increased risk for infection–talk with your healthcare provider 1 or more doses   Pneumococcal conjugate vaccine (PCV13) and pneumococcal polysaccharide vaccine (PPSV23) Women at increased risk for infection–talk with your healthcare

## 2019-03-22 NOTE — PROGRESS NOTES
Igor Roman is a 44year old female.   Patient presents with:  Physical      HPI:   Comes for physical  C/C physical  C/o coccyx pain is better -- was told she had weak pelvic floor and went to PT by itself           History  tail bone pain since feb cancer -- concerned and would like a full body check   RESPIRATORY: denies shortness of breath, cough, wheezing  CARDIOVASCULAR: denies chest pain on exertion, palpitations, swelling in feet  GI: denies abdominal pain and denies heartburn, nausea or vomiti be done as once fasting  Flu shot up-to-date    The patient indicates understanding of these issues and agrees to the plan. No Follow-up on file.

## 2019-06-19 ENCOUNTER — OFFICE VISIT (OUTPATIENT)
Dept: DERMATOLOGY CLINIC | Facility: CLINIC | Age: 44
End: 2019-06-19

## 2019-06-19 DIAGNOSIS — L21.9 SEBORRHEIC DERMATITIS: ICD-10-CM

## 2019-06-19 DIAGNOSIS — D23.72 DERMATOFIBROMA OF LEFT LOWER LEG: Primary | ICD-10-CM

## 2019-06-19 DIAGNOSIS — D23.9 BENIGN NEOPLASM OF SKIN, UNSPECIFIED LOCATION: ICD-10-CM

## 2019-06-19 PROCEDURE — 99212 OFFICE O/P EST SF 10 MIN: CPT | Performed by: DERMATOLOGY

## 2019-06-19 PROCEDURE — 99213 OFFICE O/P EST LOW 20 MIN: CPT | Performed by: DERMATOLOGY

## 2019-06-19 RX ORDER — KETOCONAZOLE 20 MG/ML
SHAMPOO TOPICAL
Qty: 120 ML | Refills: 3 | Status: SHIPPED | OUTPATIENT
Start: 2019-06-19

## 2019-06-19 RX ORDER — FLUOCINONIDE 0.5 MG/ML
SOLUTION TOPICAL
Qty: 60 ML | Refills: 3 | Status: SHIPPED | OUTPATIENT
Start: 2019-06-19

## 2019-06-30 NOTE — PROGRESS NOTES
Joleen Barber is a 38year old female. HPI:     CC:  Patient presents with:  Lesion: LOV 2/`3/2018 Patient present with raised red bloody lesion on L left . Patient deneis any hx of sc.  Patient has family hx of sc  Derm Problem: Patient present with Financial resource strain: Not on file      Food insecurity:        Worry: Not on file        Inability: Not on file      Transportation needs:        Medical: Not on file        Non-medical: Not on file    Tobacco Use      Smoking status: Never Smoker Mother         Cancer, thyroid   • Breast Cancer Mother 39   • Other (Other) Paternal Aunt        There were no vitals filed for this visit. HPI:    Patient presents with:  Lesion: LOV 2/`3/2018 Patient present with raised red bloody lesion on L left . history and physical exam also:    Assessment / plan:    No orders of the defined types were placed in this encounter.       Meds & Refills for this Visit:  Requested Prescriptions     Signed Prescriptions Disp Refills   • Ketoconazole 2 % External Shampoo errors in recognition.

## 2020-04-16 ENCOUNTER — PATIENT MESSAGE (OUTPATIENT)
Dept: DERMATOLOGY CLINIC | Facility: CLINIC | Age: 45
End: 2020-04-16

## 2024-10-01 ENCOUNTER — OFFICE VISIT (OUTPATIENT)
Dept: OBGYN CLINIC | Facility: CLINIC | Age: 49
End: 2024-10-01

## 2024-10-01 VITALS
DIASTOLIC BLOOD PRESSURE: 74 MMHG | WEIGHT: 178.81 LBS | SYSTOLIC BLOOD PRESSURE: 112 MMHG | BODY MASS INDEX: 32 KG/M2 | HEART RATE: 69 BPM

## 2024-10-01 DIAGNOSIS — Z30.432 ENCOUNTER FOR REMOVAL OF INTRAUTERINE CONTRACEPTIVE DEVICE: Primary | ICD-10-CM

## 2024-10-01 DIAGNOSIS — N95.1 PERIMENOPAUSE: ICD-10-CM

## 2024-10-01 PROCEDURE — 3078F DIAST BP <80 MM HG: CPT | Performed by: OBSTETRICS & GYNECOLOGY

## 2024-10-01 PROCEDURE — 58301 REMOVE INTRAUTERINE DEVICE: CPT | Performed by: OBSTETRICS & GYNECOLOGY

## 2024-10-01 PROCEDURE — 3074F SYST BP LT 130 MM HG: CPT | Performed by: OBSTETRICS & GYNECOLOGY

## 2024-10-01 PROCEDURE — 99202 OFFICE O/P NEW SF 15 MIN: CPT | Performed by: OBSTETRICS & GYNECOLOGY

## 2024-10-03 NOTE — PROGRESS NOTES
Vivien Astorga is a 49 year old female  No LMP recorded. (Menstrual status: IUD - Intrauterine Device).   Chief Complaint   Patient presents with    Consult     DISCUSS IUD REMOVAL & HRT      Last seen in .   Had Mirena IUD exchange in 2018 with [facility].   Has occasional spotting.    Pt wanting to have IUD removed.       Having weight gain, brain fog, depression, decreased libido, and mild hot flash in face.  Got progesterone cream via online pharmacy but it has not worked with symptoms  PCP offered lexapro but pt declined.      OBSTETRICS HISTORY:  OB History    Para Term  AB Living   2 2 2 0 0 2   SAB IAB Ectopic Multiple Live Births   0 0 0 0 2       GYNE HISTORY   Pap Date: 21  Pap Result Notes: NEGATIVE PAP / NEG HPV  Follow Up Recommendation: MAMMO BILATERAL  10/2023 BENIGN    MEDICAL HISTORY:  Past Medical History:    Pregnancy (HCC)    ; Outcome/detail:  40 week 6 lb(s) 9 oz Male    Pregnancy (HCC)    ; Outcome/detail:  39 week 7 lb(s) 15 oz Male    Skin cancer    nose     Past Surgical History:   Procedure Laterality Date      ,        SOCIAL HISTORY:  Social History     Socioeconomic History    Marital status:    Tobacco Use    Smoking status: Never    Smokeless tobacco: Never   Vaping Use    Vaping status: Never Used   Substance and Sexual Activity    Alcohol use: Yes     Comment: Socially    Drug use: No    Sexual activity: Yes     Birth control/protection: Mirena   Other Topics Concern    Caffeine Concern Yes     Comment: Soda    Pt has a pacemaker No    Pt has a defibrillator No    Reaction to local anesthetic No       MEDICATIONS:  Current Outpatient Medications   Medication Sig Dispense Refill    Omega-3 Fatty Acids (OMEGA-3 FISH OIL OR) Take 4 capsules by mouth daily.      MAGNESIUM OR Take 200 mg by mouth daily.      Ergocalciferol (VITAMIN D OR) Take 4 tablets by mouth daily.      Fluocinonide 0.05 % External Solution Use bid  as dir 60 mL 3    Desonide 0.05 % External Cream Apply to affected area 1-2x/day as needed 30 g 2    Econazole Nitrate 1 % External Cream Apply y1-2x/day as needed 30 g 2    Ketoconazole 2 % External Shampoo Apply to scalp 2 times per week. (Patient not taking: Reported on 10/1/2024) 120 mL 3       ALLERGIES:  No Known Allergies      PHYSICAL EXAM:   /74   Pulse 69   Wt 178 lb 12.8 oz (81.1 kg)   BMI 31.67 kg/m²   Body mass index is 31.67 kg/m².    Constitutional: well developed, well nourished       Pelvic Exam:  External Genitalia: normal appearance, hair distribution, and no lesions  Urethral Meatus:  normal in size, location, without lesions and prolapse  Bladder:  No fullness, masses or tenderness  Vagina:  Normal appearance without lesions, no abnormal discharge  Cervix:  Normal.   IUD strings seen      IUD Removal     Consent signed.  Procedure discussed with the patient in detail including indication, risks, benefits, alternatives and complications.    Procedure:  Speculum placed in the vagina.  Ring forceps used to grasp IUD strings.  Mirena IUD was removed without difficulty.  The patient tolerated the procedure well.      Assessment & Plan:  1. Encounter for removal of intrauterine contraceptive device  IUD removed.  Will monitor periods    2.  Perimenopause  Since she is still getting withdrawal bleeding with IUD, pt is not in menopause.  Discussed ocp or mini pill to see if it will help symptoms.  If she is in menopause then HRT.  Discussed Effexor and Veozah        Requested Prescriptions      No prescriptions requested or ordered in this encounter

## 2024-11-17 ENCOUNTER — PATIENT MESSAGE (OUTPATIENT)
Dept: OBGYN CLINIC | Facility: CLINIC | Age: 49
End: 2024-11-17

## 2024-11-18 NOTE — TELEPHONE ENCOUNTER
Message to Dr. Ibarra.  Patient requesting prescription for HRT to help with night sweats and increased anxiety.  Patient was using a progesterone cream from an online pharmacy but stopped it in September.  HRT was discussed at visit on 10/1/2024.  Message to Dr. Ibarra for recommendations.

## 2024-11-20 RX ORDER — ESTRADIOL 0.5 MG/1
0.5 TABLET ORAL DAILY
Qty: 90 TABLET | Refills: 0 | Status: SHIPPED | OUTPATIENT
Start: 2024-11-20

## 2024-11-20 RX ORDER — PROGESTERONE 200 MG/1
CAPSULE ORAL
Qty: 42 CAPSULE | Refills: 0 | Status: SHIPPED | OUTPATIENT
Start: 2024-11-20

## 2024-11-20 NOTE — TELEPHONE ENCOUNTER
Estrace 0.5mg q day.   Prometrium 200 mg from D1-14 every month.  Since she is not yet in menopause, this regimen is going to make her have a period each month--predictable period.  Can try for 3 months and give update.

## 2024-12-27 ENCOUNTER — TELEPHONE (OUTPATIENT)
Dept: INTERNAL MEDICINE CLINIC | Facility: CLINIC | Age: 49
End: 2024-12-27

## 2024-12-27 NOTE — TELEPHONE ENCOUNTER
Name and  verified. Patient states she would like to start seeing Dr. Worthington again - she switched groups due to a medical issue she she would like to begin following with Dr. Worthington again.     Dr. Worthington please advise, patient would like to establish care with you again - I do not see any open appointments that are not TCM or Res 24 between now and 3/27/2025.

## 2024-12-28 NOTE — TELEPHONE ENCOUNTER
Res 24 and TCMs. Are for emergecy follow ups --pls treat them as such    She can make an appointment at the regular slot

## 2025-01-29 PROBLEM — F41.9 ANXIETY AND DEPRESSION: Status: ACTIVE | Noted: 2024-06-27

## 2025-01-29 PROBLEM — L90.5 SCAR: Status: ACTIVE | Noted: 2021-05-21

## 2025-01-29 PROBLEM — F32.A ANXIETY AND DEPRESSION: Status: ACTIVE | Noted: 2024-06-27

## 2025-01-30 ENCOUNTER — OFFICE VISIT (OUTPATIENT)
Dept: INTERNAL MEDICINE CLINIC | Facility: CLINIC | Age: 50
End: 2025-01-30

## 2025-01-30 VITALS
WEIGHT: 181.19 LBS | SYSTOLIC BLOOD PRESSURE: 103 MMHG | OXYGEN SATURATION: 95 % | HEART RATE: 81 BPM | BODY MASS INDEX: 32.11 KG/M2 | HEIGHT: 63 IN | DIASTOLIC BLOOD PRESSURE: 73 MMHG

## 2025-01-30 DIAGNOSIS — Z12.31 VISIT FOR SCREENING MAMMOGRAM: ICD-10-CM

## 2025-01-30 DIAGNOSIS — Z12.11 COLON CANCER SCREENING: ICD-10-CM

## 2025-01-30 DIAGNOSIS — N95.1 PERIMENOPAUSE: ICD-10-CM

## 2025-01-30 DIAGNOSIS — Z00.00 HEALTH MAINTENANCE EXAMINATION: Primary | ICD-10-CM

## 2025-01-30 RX ORDER — TRETINOIN 0.1 MG/G
GEL TOPICAL
COMMUNITY
Start: 2022-03-14

## 2025-01-30 NOTE — PROGRESS NOTES
Subjective     Chief Complaint   Patient presents with    [facility]     New pt.     Physical       Vivien Astorga is a 49 year old female who is presenting today to [facility] and for an annual exam.     Patient states she has been having perimenopause symptoms like hot flashes, weight gain, depression, labile mood. She recently saw her ob/gyn who started her on HRT. Patient states her symptoms are slightly improved but not resolved completely.     Patient had a cologuard done 2-3 years ago.     She is due for pap smear and mammogram.     Patient denies chest pain, SOB, N/V, hematuria, BRBPR, mood disturbances.        Past Medical History:    Pregnancy (HCC)    ; Outcome/detail:  40 week 6 lb(s) 9 oz Male    Pregnancy (HCC)    ; Outcome/detail:  39 week 7 lb(s) 15 oz Male    Skin cancer    nose       Past Surgical History:   Procedure Laterality Date      ,        Allergies[1]    Family History   Problem Relation Age of Onset    Cancer Mother         Cancer, thyroid    Breast Cancer Mother 45    Other (Other) Paternal Aunt        Social History     Socioeconomic History    Marital status:    Tobacco Use    Smoking status: Never    Smokeless tobacco: Never   Vaping Use    Vaping status: Never Used   Substance and Sexual Activity    Alcohol use: Yes     Comment: Socially    Drug use: No    Sexual activity: Yes     Birth control/protection: Mirena   Other Topics Concern    Caffeine Concern Yes     Comment: Soda    Pt has a pacemaker No    Pt has a defibrillator No    Reaction to local anesthetic No         I have personally reviewed and updated the following EMR sections as appropriate: Current medications, Allergies, Problem list, Past Medical History, Past Surgical History, Social History and Family History    Review of Systems   Constitutional:  Negative for chills and fever.   Respiratory:  Negative for cough and shortness of breath.    Cardiovascular:  Negative  for chest pain.   Gastrointestinal:  Negative for abdominal pain and diarrhea.   Endocrine: Negative for polydipsia and polyuria.   Musculoskeletal:  Negative for joint swelling.   Skin:  Negative for rash and wound.   Neurological:  Negative for dizziness and numbness.       Objective     Medications Ordered Prior to Encounter[2]  /73 (BP Location: Right arm, Patient Position: Sitting, Cuff Size: adult)   Pulse 81   Ht 5' 3\" (1.6 m)   Wt 181 lb 3.2 oz (82.2 kg)   LMP 10/15/2024 (Exact Date)   SpO2 95%   BMI 32.10 kg/m²   Physical Exam  Vitals reviewed.   Constitutional:       Appearance: Normal appearance.   HENT:      Head: Normocephalic and atraumatic.   Cardiovascular:      Rate and Rhythm: Normal rate and regular rhythm.   Pulmonary:      Effort: Pulmonary effort is normal.      Breath sounds: Normal breath sounds.   Abdominal:      General: Abdomen is flat. There is no distension.      Palpations: Abdomen is soft.      Tenderness: There is no abdominal tenderness.   Musculoskeletal:      Cervical back: Normal range of motion and neck supple.   Lymphadenopathy:      Cervical: No cervical adenopathy.   Skin:     General: Skin is warm and dry.   Neurological:      General: No focal deficit present.      Mental Status: She is alert and oriented to person, place, and time.   Psychiatric:         Mood and Affect: Mood normal.         Behavior: Behavior normal.         No results found for this or any previous visit (from the past 14 weeks).    Assessment and Plan      Health maintenance examination (Primary)  -     CBC With Differential With Platelet; Future; Expected date: 01/30/2025  -     Comp Metabolic Panel (14); Future; Expected date: 01/30/2025  -     Hemoglobin A1C  -     Lipid Panel; Future; Expected date: 01/30/2025  -     TSH W Reflex To Free T4; Future; Expected date: 01/30/2025  Visit for screening mammogram  -     [facility] JACOBY 2D+3D SCREENING BILAT (CPT=77067/76099); Future; Expected date:  01/30/2025  Colon cancer screening  -     COLOGUARD COLON CANCER SCREENING (EXTERNAL)  Perimenopause  Assessment & Plan:  I encouraged patient to schedule with ob/gyn to discuss next steps.   Patient declined being started on antidepressants.   I advised patient to schedule a follow-up appt if she wishes to discuss weight loss medications.      No follow-ups on file.    Kamala Jacobson MD  Internal Medicine  1/30/2025       [1] No Known Allergies  [2]   Current Outpatient Medications on File Prior to Visit   Medication Sig Dispense Refill    Tretinoin 0.01 % External Gel Apply pea size amount to face at bedtime      estradiol (ESTRACE) 0.5 MG Oral Tab Take 1 tablet (0.5 mg total) by mouth daily. 90 tablet 0    progesterone (PROMETRIUM) 200 MG Oral Cap Take one tablet by mouth on day 1-14 of cycle every month. 42 capsule 0    Omega-3 Fatty Acids (OMEGA-3 FISH OIL OR) Take 4 capsules by mouth daily.      MAGNESIUM OR Take 200 mg by mouth daily.      Ergocalciferol (VITAMIN D OR) Take 4 tablets by mouth daily.      Fluocinonide 0.05 % External Solution Use bid as dir 60 mL 3    Desonide 0.05 % External Cream Apply to affected area 1-2x/day as needed 30 g 2    Econazole Nitrate 1 % External Cream Apply y1-2x/day as needed 30 g 2    Ketoconazole 2 % External Shampoo Apply to scalp 2 times per week. (Patient not taking: Reported on 1/30/2025) 120 mL 3     No current facility-administered medications on file prior to visit.

## 2025-01-30 NOTE — ASSESSMENT & PLAN NOTE
I encouraged patient to schedule with ob/gyn to discuss next steps.   Patient declined being started on antidepressants.   I advised patient to schedule a follow-up appt if she wishes to discuss weight loss medications.

## 2025-02-12 ENCOUNTER — PATIENT MESSAGE (OUTPATIENT)
Dept: INTERNAL MEDICINE CLINIC | Facility: CLINIC | Age: 50
End: 2025-02-12

## 2025-02-14 ENCOUNTER — APPOINTMENT (OUTPATIENT)
Dept: LAB | Age: 50
End: 2025-02-14
Attending: STUDENT IN AN ORGANIZED HEALTH CARE EDUCATION/TRAINING PROGRAM
Payer: COMMERCIAL

## 2025-02-14 DIAGNOSIS — Z00.00 HEALTH MAINTENANCE EXAMINATION: ICD-10-CM

## 2025-02-14 LAB
ALBUMIN SERPL-MCNC: 4.1 G/DL (ref 3.2–4.8)
ALBUMIN/GLOB SERPL: 1.3 {RATIO} (ref 1–2)
ALP LIVER SERPL-CCNC: 54 U/L
ALT SERPL-CCNC: 14 U/L
ANION GAP SERPL CALC-SCNC: 10 MMOL/L (ref 0–18)
AST SERPL-CCNC: 16 U/L (ref ?–34)
BASOPHILS # BLD AUTO: 0.04 X10(3) UL (ref 0–0.2)
BASOPHILS NFR BLD AUTO: 0.5 %
BILIRUB SERPL-MCNC: 0.4 MG/DL (ref 0.3–1.2)
BUN BLD-MCNC: 11 MG/DL (ref 9–23)
BUN/CREAT SERPL: 15.1 (ref 10–20)
CALCIUM BLD-MCNC: 8.9 MG/DL (ref 8.7–10.4)
CHLORIDE SERPL-SCNC: 102 MMOL/L (ref 98–112)
CHOLEST SERPL-MCNC: 192 MG/DL (ref ?–200)
CO2 SERPL-SCNC: 26 MMOL/L (ref 21–32)
CREAT BLD-MCNC: 0.73 MG/DL
DEPRECATED RDW RBC AUTO: 40.1 FL (ref 35.1–46.3)
EGFRCR SERPLBLD CKD-EPI 2021: 101 ML/MIN/1.73M2 (ref 60–?)
EOSINOPHIL # BLD AUTO: 0.3 X10(3) UL (ref 0–0.7)
EOSINOPHIL NFR BLD AUTO: 3.8 %
ERYTHROCYTE [DISTWIDTH] IN BLOOD BY AUTOMATED COUNT: 11.9 % (ref 11–15)
EST. AVERAGE GLUCOSE BLD GHB EST-MCNC: 114 MG/DL (ref 68–126)
FASTING PATIENT LIPID ANSWER: YES
FASTING STATUS PATIENT QL REPORTED: YES
GLOBULIN PLAS-MCNC: 3.1 G/DL (ref 2–3.5)
GLUCOSE BLD-MCNC: 93 MG/DL (ref 70–99)
HBA1C MFR BLD: 5.6 % (ref ?–5.7)
HCT VFR BLD AUTO: 40.3 %
HDLC SERPL-MCNC: 50 MG/DL (ref 40–59)
HGB BLD-MCNC: 13.6 G/DL
IMM GRANULOCYTES # BLD AUTO: 0.01 X10(3) UL (ref 0–1)
IMM GRANULOCYTES NFR BLD: 0.1 %
LDLC SERPL CALC-MCNC: 129 MG/DL (ref ?–100)
LYMPHOCYTES # BLD AUTO: 3.36 X10(3) UL (ref 1–4)
LYMPHOCYTES NFR BLD AUTO: 42.5 %
MCH RBC QN AUTO: 30.8 PG (ref 26–34)
MCHC RBC AUTO-ENTMCNC: 33.7 G/DL (ref 31–37)
MCV RBC AUTO: 91.2 FL
MONOCYTES # BLD AUTO: 0.56 X10(3) UL (ref 0.1–1)
MONOCYTES NFR BLD AUTO: 7.1 %
NEUTROPHILS # BLD AUTO: 3.63 X10 (3) UL (ref 1.5–7.7)
NEUTROPHILS # BLD AUTO: 3.63 X10(3) UL (ref 1.5–7.7)
NEUTROPHILS NFR BLD AUTO: 46 %
NONHDLC SERPL-MCNC: 142 MG/DL (ref ?–130)
OSMOLALITY SERPL CALC.SUM OF ELEC: 285 MOSM/KG (ref 275–295)
PLATELET # BLD AUTO: 273 10(3)UL (ref 150–450)
POTASSIUM SERPL-SCNC: 4 MMOL/L (ref 3.5–5.1)
PROT SERPL-MCNC: 7.2 G/DL (ref 5.7–8.2)
RBC # BLD AUTO: 4.42 X10(6)UL
SODIUM SERPL-SCNC: 138 MMOL/L (ref 136–145)
TRIGL SERPL-MCNC: 70 MG/DL (ref 30–149)
TSI SER-ACNC: 1.16 UIU/ML (ref 0.55–4.78)
VLDLC SERPL CALC-MCNC: 13 MG/DL (ref 0–30)
WBC # BLD AUTO: 7.9 X10(3) UL (ref 4–11)

## 2025-02-14 PROCEDURE — 36415 COLL VENOUS BLD VENIPUNCTURE: CPT

## 2025-02-14 PROCEDURE — 85025 COMPLETE CBC W/AUTO DIFF WBC: CPT

## 2025-02-14 PROCEDURE — 83036 HEMOGLOBIN GLYCOSYLATED A1C: CPT | Performed by: STUDENT IN AN ORGANIZED HEALTH CARE EDUCATION/TRAINING PROGRAM

## 2025-02-14 PROCEDURE — 84443 ASSAY THYROID STIM HORMONE: CPT

## 2025-02-14 PROCEDURE — 80053 COMPREHEN METABOLIC PANEL: CPT

## 2025-02-14 PROCEDURE — 80061 LIPID PANEL: CPT

## 2025-02-18 ENCOUNTER — OFFICE VISIT (OUTPATIENT)
Dept: OBGYN CLINIC | Facility: CLINIC | Age: 50
End: 2025-02-18

## 2025-02-18 VITALS
WEIGHT: 181.19 LBS | SYSTOLIC BLOOD PRESSURE: 112 MMHG | HEART RATE: 79 BPM | DIASTOLIC BLOOD PRESSURE: 72 MMHG | BODY MASS INDEX: 32 KG/M2

## 2025-02-18 DIAGNOSIS — N95.1 PERIMENOPAUSAL VASOMOTOR SYMPTOMS: Primary | ICD-10-CM

## 2025-02-18 PROCEDURE — 3078F DIAST BP <80 MM HG: CPT | Performed by: OBSTETRICS & GYNECOLOGY

## 2025-02-18 PROCEDURE — 99212 OFFICE O/P EST SF 10 MIN: CPT | Performed by: OBSTETRICS & GYNECOLOGY

## 2025-02-18 PROCEDURE — 3074F SYST BP LT 130 MM HG: CPT | Performed by: OBSTETRICS & GYNECOLOGY

## 2025-02-18 RX ORDER — PROGESTERONE 200 MG/1
CAPSULE ORAL
Qty: 45 CAPSULE | Refills: 1 | Status: SHIPPED | OUTPATIENT
Start: 2025-02-18

## 2025-02-18 RX ORDER — ESTRADIOL 0.5 MG/1
0.5 TABLET ORAL DAILY
Qty: 90 TABLET | Refills: 1 | Status: SHIPPED | OUTPATIENT
Start: 2025-02-18

## 2025-02-19 NOTE — PROGRESS NOTES
Vivien Astorga is a 49 year old female  Patient's last menstrual period was 2025 (exact date).   Chief Complaint   Patient presents with    Follow - Up     3 MONTH Follow up TO MEDICATION      Last seen 10/1/2024.  Here for follow up.    Mirena IUD removed 10/1/24.   LMP 10/15/24.    Has been on HRT for vasomotor symptoms.  Taking estrace 0.5 mg q day and prometrium 200 mg D1-14.   Had some spotting 25    States she no longer has night sweats and she is able to sleep.  Only complaint is weight gain.  Wants to continue HRT      OBSTETRICS HISTORY:  OB History    Para Term  AB Living   2 2 2 0 0 2   SAB IAB Ectopic Multiple Live Births   0 0 0 0 2       GYNE HISTORY   Pap Date: 21  Pap Result Notes: NEGATIVE PAP / NEG HPV  Follow Up Recommendation: MAMMO BILATERAL  10/2023 BENIGN    MEDICAL HISTORY:  Past Medical History:    Pregnancy (HCC)    ; Outcome/detail:  40 week 6 lb(s) 9 oz Male    Pregnancy (HCC)    ; Outcome/detail:  39 week 7 lb(s) 15 oz Male    Skin cancer    nose     Past Surgical History:   Procedure Laterality Date      ,        SOCIAL HISTORY:  Social History     Socioeconomic History    Marital status:    Tobacco Use    Smoking status: Never    Smokeless tobacco: Never   Vaping Use    Vaping status: Never Used   Substance and Sexual Activity    Alcohol use: Yes     Comment: Socially    Drug use: No    Sexual activity: Yes     Birth control/protection: Mirena   Other Topics Concern    Caffeine Concern Yes     Comment: Soda    Pt has a pacemaker No    Pt has a defibrillator No    Reaction to local anesthetic No     Social Drivers of Health     Food Insecurity: No Food Insecurity (2025)    NCSS - Food Insecurity     Worried About Running Out of Food in the Last Year: No     Ran Out of Food in the Last Year: No   Transportation Needs: No Transportation Needs (2025)    NCSS - Transportation     Lack of  Transportation: No   Housing Stability: Not At Risk (1/30/2025)    NCSS - Housing/Utilities     Has Housing: Yes     Worried About Losing Housing: No     Unable to Get Utilities: No       MEDICATIONS:  Current Outpatient Medications   Medication Sig Dispense Refill    progesterone (PROMETRIUM) 200 MG Oral Cap Take one tablet by mouth on day 1-14 of cycle every month. 45 capsule 1    estradiol (ESTRACE) 0.5 MG Oral Tab Take 1 tablet (0.5 mg total) by mouth daily. 90 tablet 1    Tretinoin 0.01 % External Gel Apply pea size amount to face at bedtime      Omega-3 Fatty Acids (OMEGA-3 FISH OIL OR) Take 4 capsules by mouth daily.      MAGNESIUM OR Take 200 mg by mouth daily.      Ergocalciferol (VITAMIN D OR) Take 4 tablets by mouth daily.      Fluocinonide 0.05 % External Solution Use bid as dir 60 mL 3    Desonide 0.05 % External Cream Apply to affected area 1-2x/day as needed 30 g 2    Econazole Nitrate 1 % External Cream Apply y1-2x/day as needed 30 g 2    Ketoconazole 2 % External Shampoo Apply to scalp 2 times per week. (Patient not taking: Reported on 10/1/2024) 120 mL 3       ALLERGIES:  Allergies[1]      PHYSICAL EXAM:   /72   Pulse 79   Wt 181 lb 3.2 oz (82.2 kg)   LMP 01/20/2025 (Exact Date)   BMI 32.10 kg/m²   Body mass index is 32.1 kg/m².    Constitutional: well developed, well nourished        Assessment & Plan:  1. Perimenopausal vasomotor symptoms  6 month refill Estrace 0.5 mg daily and Prometrium 200 mg D1-14.   RTC 6 month for annual.  Consider intermittent fasting.      Requested Prescriptions     Signed Prescriptions Disp Refills    progesterone (PROMETRIUM) 200 MG Oral Cap 45 capsule 1     Sig: Take one tablet by mouth on day 1-14 of cycle every month.    estradiol (ESTRACE) 0.5 MG Oral Tab 90 tablet 1     Sig: Take 1 tablet (0.5 mg total) by mouth daily.              [1] No Known Allergies

## 2025-02-20 ENCOUNTER — PATIENT MESSAGE (OUTPATIENT)
Dept: INTERNAL MEDICINE CLINIC | Facility: CLINIC | Age: 50
End: 2025-02-20

## 2025-02-20 ENCOUNTER — TELEMEDICINE (OUTPATIENT)
Dept: INTERNAL MEDICINE CLINIC | Facility: CLINIC | Age: 50
End: 2025-02-20
Payer: COMMERCIAL

## 2025-02-20 DIAGNOSIS — E66.01 MORBID OBESITY (HCC): Primary | ICD-10-CM

## 2025-02-20 DIAGNOSIS — E78.5 DYSLIPIDEMIA: ICD-10-CM

## 2025-02-20 PROCEDURE — 98006 SYNCH AUDIO-VIDEO EST MOD 30: CPT | Performed by: STUDENT IN AN ORGANIZED HEALTH CARE EDUCATION/TRAINING PROGRAM

## 2025-02-20 RX ORDER — TIRZEPATIDE 2.5 MG/.5ML
0.25 INJECTION, SOLUTION SUBCUTANEOUS WEEKLY
Qty: 2 ML | Refills: 0 | Status: SHIPPED | OUTPATIENT
Start: 2025-02-20

## 2025-02-20 NOTE — ASSESSMENT & PLAN NOTE
Associated with dyslipidemia.   Discussed disease definition, pathogenesis and associated risks and adverse events.   Discussed role of diet, exercise and lifestyle changes.   Explained that no particular diet has been found to be superior to others.   Discussed different diets like calorie restriction, intermittent fasting.  Discussed different medications and their role in weight loss.   Benefit, MOA and possible side effects were discussed in full, in addition to difficulties concerning availability of medication and insurance coverage.     Discussed obesity, risks, pathogenesis in full.    Discussed different weight loss medications, in addition to continued diet and exercise.   Prescribed zepbound. Benefit, MOA and possible side effects were discussed in full, including but not limited to constipation, diarrhea, nausea, vomiting, indigestion, metallic taste, hair loss, pancreatitis and medullary thyroid carcinoma.   Recommended taking first thing in the morning, on an empty stomach, waiting 5-6 hours after taking the medication before eating.   Recommended avoiding carbs, food that is high in fat.   Recommended increasing fiber intake, increasing protein intake in addition to weight-bearing exercises.

## 2025-02-20 NOTE — PROGRESS NOTES
The patient has consented to a video encounter for medical management.   This visit was performed via live interactive two-way video.    Clinician Location: [facility], [address] [address]       Start Time of Call:12:50      Subjective     No chief complaint on file.      HPI    Vivien Astorga is a 49 year old female who is presenting today to discuss weight loss medications.     Patient is frustrated with her weight. Patient has tried multiple diets and has been on a strict, comprehensive diet and exercise regimen with no significant sustained weight loss achieved. Patient is ready to start weight loss medications.     Patient also has dyslipidemia with an elevated LDL.     I have personally reviewed and updated the following EMR sections as appropriate: Current medications, Allergies, Problem list, Past Medical History, Past Surgical History, Social History and Family History    Review of Systems   Constitutional: Negative.    Respiratory: Negative.     Cardiovascular: Negative.    Gastrointestinal: Negative.    Skin: Negative.    Neurological: Negative.        Objective   Medications Ordered Prior to Encounter[1]    Physical Exam  Constitutional:       Appearance: Normal appearance.   HENT:      Head: Normocephalic and atraumatic.   Neurological:      Mental Status: She is alert and oriented to person, place, and time.   Psychiatric:         Mood and Affect: Mood normal.         Behavior: Behavior normal.         Assessment and Plan      Problem List Items Addressed This Visit          HCC Problems    Morbid obesity (HCC) - Primary     Associated with dyslipidemia.   Discussed disease definition, pathogenesis and associated risks and adverse events.   Discussed role of diet, exercise and lifestyle changes.   Explained that no particular diet has been found to be superior to others.   Discussed different diets like calorie  restriction, intermittent fasting.  Discussed different medications and their role in weight loss.   Benefit, MOA and possible side effects were discussed in full, in addition to difficulties concerning availability of medication and insurance coverage.     Discussed obesity, risks, pathogenesis in full.    Discussed different weight loss medications, in addition to continued diet and exercise.   Prescribed zepbound. Benefit, MOA and possible side effects were discussed in full, including but not limited to constipation, diarrhea, nausea, vomiting, indigestion, metallic taste, hair loss, pancreatitis and medullary thyroid carcinoma.   Recommended taking first thing in the morning, on an empty stomach, waiting 5-6 hours after taking the medication before eating.   Recommended avoiding carbs, food that is high in fat.   Recommended increasing fiber intake, increasing protein intake in addition to weight-bearing exercises.            Relevant Medications    Tirzepatide-Weight Management (ZEPBOUND) 2.5 MG/0.5ML Subcutaneous Solution Auto-injector       Cardiac and Vasculature    Dyslipidemia           End Time of Call: 01:05  Total time spent on call:  15 minutes    Kamala Jacobson MD  Internal Medicine  2/20/2025       [1]   Current Outpatient Medications on File Prior to Visit   Medication Sig Dispense Refill    progesterone (PROMETRIUM) 200 MG Oral Cap Take one tablet by mouth on day 1-14 of cycle every month. 45 capsule 1    estradiol (ESTRACE) 0.5 MG Oral Tab Take 1 tablet (0.5 mg total) by mouth daily. 90 tablet 1    Tretinoin 0.01 % External Gel Apply pea size amount to face at bedtime      Omega-3 Fatty Acids (OMEGA-3 FISH OIL OR) Take 4 capsules by mouth daily.      MAGNESIUM OR Take 200 mg by mouth daily.      Ergocalciferol (VITAMIN D OR) Take 4 tablets by mouth daily.      Ketoconazole 2 % External Shampoo Apply to scalp 2 times per week. (Patient not taking: Reported on 10/1/2024) 120 mL 3    Fluocinonide  0.05 % External Solution Use bid as dir 60 mL 3    Desonide 0.05 % External Cream Apply to affected area 1-2x/day as needed 30 g 2    Econazole Nitrate 1 % External Cream Apply y1-2x/day as needed 30 g 2     No current facility-administered medications on file prior to visit.

## 2025-02-24 NOTE — TELEPHONE ENCOUNTER
Dr Borden, patient follow up after recent office visit    onversation Subject  Last Message          Reporting back about my mother’s thyroid cancer, it was papillary & after further biopsy it might have been benign.

## 2025-02-25 ENCOUNTER — TELEPHONE (OUTPATIENT)
Dept: INTERNAL MEDICINE CLINIC | Facility: CLINIC | Age: 50
End: 2025-02-25

## 2025-02-25 NOTE — TELEPHONE ENCOUNTER
Tirzepatide-Weight Management (ZEPBOUND) 2.5     Please provide BMI    At baseline, did the patient have a body mass index (BMI) greater than or equal to 30 kilograms per meter squared (30 kg/m2)? Please Note: This refers to baseline prior to any glucagon-like peptide-1 (GLP-1) agonist (for example, Saxenda, Wegovy) or GLP-1/glucose-dependent insulinotropic polypeptide (GIP) receptor agonist (for example, Zepbound).

## 2025-02-26 NOTE — TELEPHONE ENCOUNTER
Prior authorization initiated for,await 1-5 days for decision   Zepbound    Morbid obesity (HCC)  Dyslipidemia  BMI 32.1

## 2025-02-26 NOTE — TELEPHONE ENCOUNTER
Approved    Prior authorization approved  Payer: [employer] Case ID: [id number]    [phone]    [phone]  Note from payer: CaseId:[id number];Status:Approved;Review Type:Prior Auth;Coverage Start Date:01/27/2025;Coverage End Date:10/24/2025;  Approval Details    Authorized from January 27, 2025 to October 24, 2025  Electronic appeal: Not supported

## 2025-03-25 ENCOUNTER — OFFICE VISIT (OUTPATIENT)
Dept: INTERNAL MEDICINE CLINIC | Facility: CLINIC | Age: 50
End: 2025-03-25
Payer: COMMERCIAL

## 2025-03-25 VITALS
TEMPERATURE: 97 F | HEART RATE: 83 BPM | OXYGEN SATURATION: 98 % | WEIGHT: 174 LBS | RESPIRATION RATE: 18 BRPM | BODY MASS INDEX: 30.83 KG/M2 | DIASTOLIC BLOOD PRESSURE: 74 MMHG | SYSTOLIC BLOOD PRESSURE: 111 MMHG | HEIGHT: 63 IN

## 2025-03-25 DIAGNOSIS — E66.01 MORBID OBESITY (HCC): ICD-10-CM

## 2025-03-25 PROCEDURE — 3074F SYST BP LT 130 MM HG: CPT | Performed by: STUDENT IN AN ORGANIZED HEALTH CARE EDUCATION/TRAINING PROGRAM

## 2025-03-25 PROCEDURE — 3008F BODY MASS INDEX DOCD: CPT | Performed by: STUDENT IN AN ORGANIZED HEALTH CARE EDUCATION/TRAINING PROGRAM

## 2025-03-25 PROCEDURE — 99214 OFFICE O/P EST MOD 30 MIN: CPT | Performed by: STUDENT IN AN ORGANIZED HEALTH CARE EDUCATION/TRAINING PROGRAM

## 2025-03-25 PROCEDURE — 3078F DIAST BP <80 MM HG: CPT | Performed by: STUDENT IN AN ORGANIZED HEALTH CARE EDUCATION/TRAINING PROGRAM

## 2025-03-25 RX ORDER — TIRZEPATIDE 2.5 MG/.5ML
0.25 INJECTION, SOLUTION SUBCUTANEOUS WEEKLY
Qty: 2 ML | Refills: 0 | Status: SHIPPED | OUTPATIENT
Start: 2025-03-25

## 2025-03-25 NOTE — ASSESSMENT & PLAN NOTE
Starting weight 02/18/20205: 181lbs.   Today's weight 03/25/2025: 174 lbs.     Since patient has responded so well to the 2.5 mg we will keep her on  the same dose for another 4 weeks. Increase then to 5mg.   Encouraged hydration, increased protein intake and weight bearing exercises.

## 2025-03-25 NOTE — PROGRESS NOTES
Subjective     Chief Complaint   Patient presents with    Medication Follow-Up     Update on zepbound, would like tdap today       Vivien Astorga is a 49 year old female who is presenting today for follow-up.     She has taken two doses of the zepbound so far. Responding well. Has lost 7 lbs. No side effects other than a dry mouth and an occasional headache. She has taken the first two doses 8 days apart instead of 7 days apart because she felt it was still working.     Starting weight : 181lbs.   Today's weight 2025: 174 lbs.     Patient denies chest pain, SOB, N/V, hematuria, BRBPR, mood disturbances.        Past Medical History:    Pregnancy (HCC)    ; Outcome/detail:  40 week 6 lb(s) 9 oz Male    Pregnancy (HCC)    ; Outcome/detail:  39 week 7 lb(s) 15 oz Male    Skin cancer    nose       Past Surgical History:   Procedure Laterality Date      ,        Allergies[1]    Family History   Problem Relation Age of Onset    Cancer Mother         papillary thyroid.    Breast Cancer Mother 45    Other (Other) Paternal Aunt        Social History     Socioeconomic History    Marital status:    Tobacco Use    Smoking status: Never    Smokeless tobacco: Never   Vaping Use    Vaping status: Never Used   Substance and Sexual Activity    Alcohol use: Yes     Comment: Socially    Drug use: No    Sexual activity: Yes     Birth control/protection: Mirena   Other Topics Concern    Caffeine Concern Yes     Comment: Soda    Pt has a pacemaker No    Pt has a defibrillator No    Reaction to local anesthetic No         I have personally reviewed and updated the following EMR sections as appropriate: Current medications, Allergies, Problem list, Past Medical History, Past Surgical History, Social History and Family History    Review of Systems   Constitutional: Negative.    Respiratory: Negative.     Cardiovascular: Negative.    Gastrointestinal: Negative.    Skin: Negative.     Neurological: Negative.        Objective     Medications Ordered Prior to Encounter[2]  /74 (BP Location: Right arm, Patient Position: Sitting, Cuff Size: adult)   Pulse 83   Temp 97.1 °F (36.2 °C) (Temporal)   Resp 18   Ht 5' 3\" (1.6 m)   Wt 174 lb (78.9 kg)   LMP 03/23/2025 (Exact Date)   SpO2 98%   BMI 30.82 kg/m²   Physical Exam  Vitals reviewed.   Constitutional:       Appearance: Normal appearance.   HENT:      Head: Normocephalic and atraumatic.   Skin:     General: Skin is warm and dry.   Neurological:      General: No focal deficit present.      Mental Status: She is alert and oriented to person, place, and time.   Psychiatric:         Mood and Affect: Mood normal.         Behavior: Behavior normal.         Recent Results (from the past 14 weeks)   Hemoglobin A1C    Collection Time: 02/14/25  7:34 AM   Result Value Ref Range    HgbA1C 5.6 <5.7 %    Estimated Average Glucose 114 68 - 126 mg/dL   CBC With Differential With Platelet    Collection Time: 02/14/25  7:34 AM   Result Value Ref Range    WBC 7.9 4.0 - 11.0 x10(3) uL    RBC 4.42 3.80 - 5.30 x10(6)uL    HGB 13.6 12.0 - 16.0 g/dL    HCT 40.3 35.0 - 48.0 %    MCV 91.2 80.0 - 100.0 fL    MCH 30.8 26.0 - 34.0 pg    MCHC 33.7 31.0 - 37.0 g/dL    RDW-SD 40.1 35.1 - 46.3 fL    RDW 11.9 11.0 - 15.0 %    .0 150.0 - 450.0 10(3)uL    Neutrophil Absolute Prelim 3.63 1.50 - 7.70 x10 (3) uL    Neutrophil Absolute 3.63 1.50 - 7.70 x10(3) uL    Lymphocyte Absolute 3.36 1.00 - 4.00 x10(3) uL    Monocyte Absolute 0.56 0.10 - 1.00 x10(3) uL    Eosinophil Absolute 0.30 0.00 - 0.70 x10(3) uL    Basophil Absolute 0.04 0.00 - 0.20 x10(3) uL    Immature Granulocyte Absolute 0.01 0.00 - 1.00 x10(3) uL    Neutrophil % 46.0 %    Lymphocyte % 42.5 %    Monocyte % 7.1 %    Eosinophil % 3.8 %    Basophil % 0.5 %    Immature Granulocyte % 0.1 %   Comp Metabolic Panel (14)    Collection Time: 02/14/25  7:34 AM   Result Value Ref Range    Glucose 93 70 - 99  mg/dL    Sodium 138 136 - 145 mmol/L    Potassium 4.0 3.5 - 5.1 mmol/L    Chloride 102 98 - 112 mmol/L    CO2 26.0 21.0 - 32.0 mmol/L    Anion Gap 10 0 - 18 mmol/L    BUN 11 9 - 23 mg/dL    Creatinine 0.73 0.55 - 1.02 mg/dL    BUN/CREA Ratio 15.1 10.0 - 20.0    Calcium, Total 8.9 8.7 - 10.4 mg/dL    Calculated Osmolality 285 275 - 295 mOsm/kg    eGFR-Cr 101 >=60 mL/min/1.73m2    ALT 14 10 - 49 U/L    AST 16 <34 U/L    Alkaline Phosphatase 54 39 - 100 U/L    Bilirubin, Total 0.4 0.3 - 1.2 mg/dL    Total Protein 7.2 5.7 - 8.2 g/dL    Albumin 4.1 3.2 - 4.8 g/dL    Globulin  3.1 2.0 - 3.5 g/dL    A/G Ratio 1.3 1.0 - 2.0    Patient Fasting for CMP? Yes    Lipid Panel    Collection Time: 02/14/25  7:34 AM   Result Value Ref Range    Cholesterol, Total 192 <200 mg/dL    HDL Cholesterol 50 40 - 59 mg/dL    Triglycerides 70 30 - 149 mg/dL    LDL Cholesterol 129 (H) <100 mg/dL    VLDL 13 0 - 30 mg/dL    Non HDL Chol 142 (H) <130 mg/dL    Patient Fasting for Lipid? Yes    TSH W Reflex To Free T4    Collection Time: 02/14/25  7:34 AM   Result Value Ref Range    TSH 1.162 0.550 - 4.780 uIU/mL       Assessment and Plan      Morbid obesity (HCC)  Assessment & Plan:  Starting weight 02/18/20205: 181lbs.   Today's weight 03/25/2025: 174 lbs.     Since patient has responded so well to the 2.5 mg we will keep her on  the same dose for another 4 weeks. Increase then to 5mg.   Encouraged hydration, increased protein intake and weight bearing exercises.   Orders:  -     Zepbound; Inject 0.25 mg into the skin once a week.  Dispense: 2 mL; Refill: 0      Return in about 4 weeks (around 4/22/2025).    Kamala Jacobson MD  Internal Medicine  3/25/2025       [1] No Known Allergies  [2]   Current Outpatient Medications on File Prior to Visit   Medication Sig Dispense Refill    Tretinoin 0.01 % External Gel Apply pea size amount to face at bedtime      Omega-3 Fatty Acids (OMEGA-3 FISH OIL OR) Take 4 capsules by mouth daily.      MAGNESIUM OR  Take 200 mg by mouth daily.      Ergocalciferol (VITAMIN D OR) Take 4 tablets by mouth daily.      Fluocinonide 0.05 % External Solution Use bid as dir 60 mL 3    Desonide 0.05 % External Cream Apply to affected area 1-2x/day as needed 30 g 2    Econazole Nitrate 1 % External Cream Apply y1-2x/day as needed 30 g 2     No current facility-administered medications on file prior to visit.

## 2025-03-26 NOTE — TELEPHONE ENCOUNTER
Pharmacy contacts clinic regarding zepbound dose.  Written for 2.5 mg but sig states 0.25 mg (which is ozempic dosing).  Dr. Jacobson please advise if it should be written for 2.5 mg weekly. New prescription pended if appropriate.

## 2025-03-27 RX ORDER — TIRZEPATIDE 2.5 MG/.5ML
2.5 INJECTION, SOLUTION SUBCUTANEOUS WEEKLY
Qty: 2 ML | Refills: 0 | Status: SHIPPED | OUTPATIENT
Start: 2025-03-27 | End: 2025-04-18

## 2025-04-28 ENCOUNTER — PATIENT MESSAGE (OUTPATIENT)
Dept: INTERNAL MEDICINE CLINIC | Facility: CLINIC | Age: 50
End: 2025-04-28

## 2025-04-30 DIAGNOSIS — E66.01 MORBID OBESITY (HCC): ICD-10-CM

## 2025-04-30 RX ORDER — TIRZEPATIDE 2.5 MG/.5ML
0.25 INJECTION, SOLUTION SUBCUTANEOUS WEEKLY
Qty: 2 ML | Refills: 0 | Status: CANCELLED | OUTPATIENT
Start: 2025-04-30

## 2025-04-30 NOTE — TELEPHONE ENCOUNTER
Which GLP is the patient on: zepbound   Current dose: 2.5mg   Patient seeking refill or increase to next dose: increase   How many doses of current dose completed: 8  Side effects, if any:   Current weight / how much weight loss:   Last visit: 3/25/25

## 2025-05-02 NOTE — TELEPHONE ENCOUNTER
Please review. Protocol Failed; No Protocol    Patients GLP response:       Hello, I am currently at 169.5, I have lost 10.5 pounds. I weight myself almost every morning. I have had no side effects or concerns on the 2.5 however I would like to go up to 5mg.    Please advise as Zepbound 5 mg is pended for review

## 2025-05-03 RX ORDER — TIRZEPATIDE 5 MG/.5ML
5 INJECTION, SOLUTION SUBCUTANEOUS WEEKLY
Qty: 2 ML | Refills: 0 | Status: SHIPPED | OUTPATIENT
Start: 2025-05-03 | End: 2025-05-25

## 2025-05-17 ENCOUNTER — HOSPITAL ENCOUNTER (OUTPATIENT)
Age: 50
Discharge: HOME | End: 2025-05-17
Payer: COMMERCIAL

## 2025-05-17 VITALS
HEART RATE: 92 BPM | OXYGEN SATURATION: 98 % | DIASTOLIC BLOOD PRESSURE: 68 MMHG | SYSTOLIC BLOOD PRESSURE: 112 MMHG | RESPIRATION RATE: 18 BRPM | TEMPERATURE: 98 F

## 2025-05-17 DIAGNOSIS — J01.90 ACUTE NON-RECURRENT SINUSITIS, UNSPECIFIED LOCATION: Primary | ICD-10-CM

## 2025-05-17 DIAGNOSIS — R09.81 NASAL CONGESTION: ICD-10-CM

## 2025-05-17 DIAGNOSIS — U07.1 COVID-19 VIRUS INFECTION: ICD-10-CM

## 2025-05-17 LAB
POCT INFLUENZA A: NEGATIVE
POCT INFLUENZA B: NEGATIVE
SARS-COV-2 RNA RESP QL NAA+PROBE: DETECTED

## 2025-05-17 RX ORDER — IBUPROFEN 600 MG/1
TABLET, FILM COATED ORAL
Qty: 20 TABLET | Refills: 0 | Status: SHIPPED | OUTPATIENT
Start: 2025-05-17

## 2025-05-17 NOTE — ED INITIAL ASSESSMENT (HPI)
Pt c/o nasal congestion  started 6 days ago + sinus pressure + head congestion started 3 days ago denies fever

## 2025-05-17 NOTE — ED PROVIDER NOTES
Patient Seen in: [facility]    History     Chief Complaint   Patient presents with    Sinus Problem     Stated Complaint: Sinus Pain    HPI      49-year-old female presents with chief complaint nasal congestion.  Onset 6 days ago, worsening today.  Patient reports associated sinus pain and postnasal drip.  Patient reports intermittent cough.  Patient denies fever, chills, earache, sore throat, abdominal pain, nausea, vomiting, diarrhea, constipation, dysuria, hematuria, flank pain, rash, neck pain, neck swelling, restricted neck movement, dyspnea, wheeze, hemoptysis, weakness, paresthesias, vision changes, altered mental status, loss of consciousness, amnesia.      Past Medical History[1]    Past Surgical History[2]         Family History[3]    Short Social Hx on File[4]    Review of Systems    Positive for stated complaint: Sinus Pain  Other systems are as noted in HPI.  Constitutional and vital signs reviewed.      All other systems reviewed and negative except as noted above.    PSFH elements reviewed from today and agreed except as otherwise stated in HPI.    Physical Exam     ED Triage Vitals [05/17/25 1137]   /68   Pulse 92   Resp 18   Temp 97.7 °F (36.5 °C)   Temp src Oral   SpO2 98 %   O2 Device None (Room air)       Current:/68   Pulse 92   Temp 97.7 °F (36.5 °C) (Oral)   Resp 18   LMP 03/23/2025 (Exact Date)   SpO2 98%     PULSE OX within normal limits on room air as interpreted by this provider.    Constitutional: The patient is cooperative. Appears well-developed and well-nourished.  Mild discomfort.  Psychological: Alert, No abnormalities of mood, affect.  Head: Normocephalic/atraumatic.   Eyes: Pupils are equal round reactive to light.  Conjunctiva are within normal limits.  ENT: Pharynx noninjected.  Tonsils within normal size limits bilaterally.  No tonsillar exudates.  TMs within normal limits bilaterally.  Mucous membranes moist.  Positive nasal congestion.  Neck: The  neck is supple.  Nontender.  No meningeal signs.  Chest: There is no tenderness to the chest wall.  No CVA tenderness bilaterally.  Respiratory: Respiratory effort was normal.  There is no rales, wheezes, or rhonchi.  There is no stridor.  Air entry is equal.  Cardiovascular: Regular rate and rhythm.  Capillary refill is brisk.    Genitourinary: Not examined.  Lymphatic: No gross lymphadenopathy noted.  Musculoskeletal: Musculoskeletal system is grossly intact.  There is no obvious deformity.  Neurological: No facial asymmetry.  Normal gait.  Normal sensory exam.  Patient exhibits normal speech.  Strength and range of motion symmetrical of all extremities x4.  Skin: Skin is normal to inspection.  Warm and dry.  No obvious bruising.  No obvious rash.          ED Course     Labs Reviewed   RAPID SARS-COV-2 BY PCR - Abnormal; Notable for the following components:       Result Value    Rapid SARS-CoV-2 by PCR Detected (*)     All other components within normal limits   POCT FLU TEST - Normal    Narrative:     This assay is a rapid molecular in vitro test utilizing nucleic acid amplification of influenza A and B viral RNA.       MDM     Differential diagnosis including but not limited to URI, sinusitis, rhinitis, bronchitis, pneumonia    Influenza negative.  COVID-19 positive.    Physical exam remained stable as previously documented.  Available results reviewed with patient.    I have given the patient instructions regarding their diagnoses, expectations, follow up, and ER precautions. I explained to the patient that emergent conditions may arise and to go to the ER for new, worsening or any persistent conditions. I've explained the importance of following up with their doctor as instructed. The patient verbalized understanding of the discharge instructions and plan.    Disposition and Plan     Clinical Impression:  1. Acute non-recurrent sinusitis, unspecified location    2. Nasal congestion    3. COVID-19 virus  infection        Disposition:  Discharge    Follow-up:  Kamala Jacobson MD  [address] [place] [id number]  [phone]    Call in 1 day  For follow-up      Medications Prescribed:  Current Discharge Medication List        START taking these medications    Details   amoxicillin clavulanate 875-125 MG Oral Tab Take 1 tablet by mouth 2 (two) times daily for 7 days.  Qty: 14 tablet, Refills: 0      ibuprofen 600 MG Oral Tab Take 1 tablet (600 mg total) by mouth every 6 hours with food  Qty: 20 tablet, Refills: 0                            [1]   Past Medical History:   Pregnancy (HCC)    ; Outcome/detail:  40 week 6 lb(s) 9 oz Male    Pregnancy (HCC)    ; Outcome/detail:  39 week 7 lb(s) 15 oz Male    Skin cancer    nose   [2]   Past Surgical History:  Procedure Laterality Date      ,    [3]   Family History  Problem Relation Age of Onset    Cancer Mother         papillary thyroid.    Breast Cancer Mother 45    Other (Other) Paternal Aunt    [4]   Social History  Socioeconomic History    Marital status:    Tobacco Use    Smoking status: Never    Smokeless tobacco: Never   Vaping Use    Vaping status: Never Used   Substance and Sexual Activity    Alcohol use: Yes     Comment: Socially    Drug use: No    Sexual activity: Yes     Birth control/protection: Mirena   Other Topics Concern    Caffeine Concern Yes     Comment: Soda    Pt has a pacemaker No    Pt has a defibrillator No    Reaction to local anesthetic No     Social Drivers of Health     Food Insecurity: No Food Insecurity (3/25/2025)    NCSS - Food Insecurity     Worried About Running Out of Food in the Last Year: No     Ran Out of Food in the Last Year: No   Transportation Needs: No Transportation Needs (3/25/2025)    NCSS - Transportation     Lack of Transportation: No   Housing Stability: Not At Risk (3/25/2025)    NCSS - Housing/Utilities     Has Housing: Yes     Worried About Losing Housing: No     Unable to  Get Utilities: No

## 2025-05-27 ENCOUNTER — PATIENT MESSAGE (OUTPATIENT)
Dept: INTERNAL MEDICINE CLINIC | Facility: CLINIC | Age: 50
End: 2025-05-27

## 2025-05-28 NOTE — TELEPHONE ENCOUNTER
Response sent to patient. Await patient's responses.       Which GLP patient is taking: Zepdound  Current dose: 5mg/0.5mL - 5 mg once a week.    Patient Request: Refill.   Number of current dose taken: **Pending patient's Response.    Side effects, if any: **Pending Patient's Response    Current weight / how much weight loss: **Pending patient's Response.   Last visit: 3/25/25

## 2025-06-03 RX ORDER — TIRZEPATIDE 5 MG/.5ML
5 INJECTION, SOLUTION SUBCUTANEOUS WEEKLY
Qty: 2 ML | Refills: 2 | Status: SHIPPED | OUTPATIENT
Start: 2025-06-03 | End: 2025-06-04

## 2025-06-04 RX ORDER — TIRZEPATIDE 5 MG/.5ML
5 INJECTION, SOLUTION SUBCUTANEOUS WEEKLY
Qty: 6 ML | Refills: 0 | Status: SHIPPED | OUTPATIENT
Start: 2025-06-04 | End: 2025-06-26

## 2025-06-04 NOTE — TELEPHONE ENCOUNTER
Patient asking for prescription to be resent in [facility] please advise

## 2025-06-05 ENCOUNTER — TELEPHONE (OUTPATIENT)
Dept: INTERNAL MEDICINE CLINIC | Facility: CLINIC | Age: 50
End: 2025-06-05

## 2025-06-30 ENCOUNTER — TELEPHONE (OUTPATIENT)
Dept: INTERNAL MEDICINE CLINIC | Facility: CLINIC | Age: 50
End: 2025-06-30

## (undated) NOTE — LETTER
AUTHORIZATION FOR SURGICAL OPERATION OR OTHER PROCEDURE    1.  I hereby authorize Dr. Elisa Underwood and [facility]3], [facility] staff assigned to my case to perform the following operation and/or procedure at the [facility]3], [facility]:    IUD REMOVAL  / Marya Talamantes ___SPRADLING,MONICA___________________________________________________  (please print)      Patient signature:  ___________________________________________________             Relationship to Patient:           []  Parent    Responsible person

## (undated) NOTE — LETTER
2/16/2018              [address]        [address] [facility]         Dear [facility],      The report of the Biopsy done on 02/13/17 shows a Compound lentiginous nevus .   This is a benign (not cancerous) growth, and requir

## (undated) NOTE — LETTER
04/22/19        Rohit Moody  [address] [facility]      Dear Matt Peters,    Our records indicate that you have outstanding lab work and or testing that was ordered for you and has not yet been completed:  Orders Placed This Encount

## (undated) NOTE — LETTER
8/8/2017              [address]        [address] [facility]         Dear Ivonne Emery,      It was a pleasure to see you at our [address] [facility] office.  Neg pap, and HPV neg, repeat

## (undated) NOTE — LETTER
06/06/19        Nicole Jaramillo  [address] [facility]      Dear [facility],    Our records indicate that you have outstanding lab work and or testing that was ordered for you and has not yet been completed:  Orders Placed This Encount

## (undated) NOTE — LETTER
Hao Puga,    This is the [facility], office of Dr. Kamala Muir    Thank you for putting your trust in [facility].  Our goal is to deliver the highest quality healthcare and an exceptional patient experience.  Upon reviewing of your medical record shows you are due for the following:      Mammogram    Colonoscopy    Pap Smear      Please call [phone] to schedule your appointment or schedule online via [employer].    If you changed to a new provider at another facility, please notify the clinic to update your records.    If you had any recent testing at another facility, please have your results faxed to our office at [phone].      Thank you and have a great day!

## (undated) NOTE — LETTER
12/13/17        [address]      Dear Shaji Diaz,    Our records indicate that you have outstanding lab work and or testing that was ordered for you and has not yet been completed:          BESSIE Dias

## (undated) NOTE — MR AVS SNAPSHOT
[facility] SPECIALTY [facility] - [place] [address] [address] [id number] [phone]               Thank you for choosing us for your health care visit with Feliciano Skaggs MD.  We are glad to serve you and happy to provide you with this summary of yo 116/80 mmHg 71 97.8 °F (36.6 °C) (Oral) 167 lb (75.751 kg)           Current Medications          This list is accurate as of: 5/22/17  3:45 PM.  Always use your most recent med list.                clotrimazole 1 % Crea   Apply 1 Application topically 2 EAT THESE FOODS MORE OFTEN: EAT THESE FOODS LESS OFTEN:   Make half your plate fruits and vegetables Highly refined, white starches including white bread, rice and pasta   Eat plenty of protein, keep the fat content low Sugars:  sodas and sports drinks, ca

## (undated) NOTE — LETTER
AUTHORIZATION FOR SURGICAL OPERATION OR OTHER PROCEDURE    1. I hereby authorize Dr. DIEGO TERRY, and [facility] staff assigned to my case to perform the following operation and/or procedure at the [facility] site:      IUD REMOVAL       2.  My physician has explained the nature and purpose of the operation or other procedure, possible alternative methods of treatment, the risks involved, and the possibility of complication to me.  I acknowledge that no guarantee has been made as to the result that may be obtained.  3.  I recognize that, during the course of this operation, or other procedure, unforseen conditions may necessitate additional or different procedure than those listed above.  I, therefore, further authorize and request that the above named physician, his/her physician assistants or designees perform such procedures as are, in his/her professional opinion, necessary and desirable.  4.  Any tissue or organs removed in the operation or other procedure may be disposed of by and at the discretion of the [facility] and [facility].  5.  I understand that in the event of a medical emergency, I will be transported by local paramedics to [facility] or other hospital emergency department.  6.  I certify that I have read and fully understand the above consent to operation and/or other procedure.    7.  I acknowledge that my physician has explained sedation/analgesia administration to me including the risks and benefits.  I consent to the administration of sedation/analgesia as may be necessary or desirable in the judgement of my physician.    Witness signature: ___________________________________________________ Date:  ______/______/_____                    Time:  ________ A.M.  P.M.       Patient Name:  ______________________________________________________  (please print)      Patient signature:   ___________________________________________________             Relationship to Patient:           []  Parent    Responsible person                          []  Spouse  In case of minor or                    [] Other  _____________   Incompetent name:  __________________________________________________                               (please print)      _____________      Responsible person  In case of minor or  Incompetent signature:  _______________________________________________    Statement of Physician  My signature below affirms that prior to the time of the procedure, I have explained to the patient and/or his/her guardian, the risks and benefits involved in the proposed treatment and any reasonable alternative to the proposed treatment.  I have also explained the risks and benefits involved in the refusal of the proposed treatment and have answered the patient's questions.                        Date:  ______/______/_______  Provider                      Signature:  __________________________________________________________       Time:  ___________ A.M    P.M.